# Patient Record
Sex: FEMALE | Race: WHITE | Employment: OTHER | URBAN - METROPOLITAN AREA
[De-identification: names, ages, dates, MRNs, and addresses within clinical notes are randomized per-mention and may not be internally consistent; named-entity substitution may affect disease eponyms.]

---

## 2017-05-31 ENCOUNTER — APPOINTMENT (OUTPATIENT)
Dept: GENERAL RADIOLOGY | Age: 67
End: 2017-05-31
Attending: PHYSICIAN ASSISTANT
Payer: MEDICARE

## 2017-05-31 ENCOUNTER — HOSPITAL ENCOUNTER (EMERGENCY)
Age: 67
Discharge: HOME | End: 2017-05-31
Attending: STUDENT IN AN ORGANIZED HEALTH CARE EDUCATION/TRAINING PROGRAM
Payer: MEDICARE

## 2017-05-31 VITALS
WEIGHT: 155 LBS | BODY MASS INDEX: 26.46 KG/M2 | HEIGHT: 64 IN | RESPIRATION RATE: 18 BRPM | SYSTOLIC BLOOD PRESSURE: 119 MMHG | DIASTOLIC BLOOD PRESSURE: 72 MMHG | OXYGEN SATURATION: 97 % | HEART RATE: 85 BPM | TEMPERATURE: 97.6 F

## 2017-05-31 DIAGNOSIS — S91.312A LACERATION OF FOOT, LEFT, INITIAL ENCOUNTER: Primary | ICD-10-CM

## 2017-05-31 PROCEDURE — 90715 TDAP VACCINE 7 YRS/> IM: CPT | Performed by: PHYSICIAN ASSISTANT

## 2017-05-31 PROCEDURE — 77030010507 HC ADH SKN DERMBND J&J -B

## 2017-05-31 PROCEDURE — 74011250636 HC RX REV CODE- 250/636: Performed by: PHYSICIAN ASSISTANT

## 2017-05-31 PROCEDURE — 90471 IMMUNIZATION ADMIN: CPT

## 2017-05-31 PROCEDURE — 73630 X-RAY EXAM OF FOOT: CPT

## 2017-05-31 PROCEDURE — 75810000293 HC SIMP/SUPERF WND  RPR

## 2017-05-31 PROCEDURE — 99282 EMERGENCY DEPT VISIT SF MDM: CPT

## 2017-05-31 RX ADMIN — TETANUS TOXOID, REDUCED DIPHTHERIA TOXOID AND ACELLULAR PERTUSSIS VACCINE, ADSORBED 0.5 ML: 5; 2.5; 8; 8; 2.5 SUSPENSION INTRAMUSCULAR at 18:25

## 2017-05-31 NOTE — ED PROVIDER NOTES
HPI Comments: 73 yo female here for evaluation of laceration to top of left foot. States she was barefoot in kitchen when she stepped on something sharp with right foot and scrapped it on top of left foot to remove and cut top of left foot. Bleeding controlled; full ROM. TD Unknown. No additional symptoms. Non smoker. Patient is a 74 y.o. female presenting with skin laceration. The history is provided by the patient and a friend. Laceration    The incident occurred less than 1 hour ago. The laceration is located on the left foot. The laceration is 1 cm in size. Foreign body present: no. The pain is at a severity of 2/10. The pain is mild. The pain has been constant since onset. It is unknown when the patient last had a tetanus shot. No past medical history on file. Past Surgical History:   Procedure Laterality Date    HX HYSTERECTOMY           Family History:   Problem Relation Age of Onset    Cancer Father      lungs       Social History     Social History    Marital status: SINGLE     Spouse name: N/A    Number of children: N/A    Years of education: N/A     Occupational History    Not on file. Social History Main Topics    Smoking status: Never Smoker    Smokeless tobacco: Not on file    Alcohol use 0.0 oz/week     0 Standard drinks or equivalent per week      Comment: 1-2 glass of wine per week.  Drug use: No    Sexual activity: Yes     Other Topics Concern    Not on file     Social History Narrative         ALLERGIES: Benzonatate and Codeine    Review of Systems   Constitutional: Negative for activity change and fever. HENT: Negative for facial swelling. Eyes: Negative for discharge. Respiratory: Negative for cough. Cardiovascular: Negative for leg swelling. Gastrointestinal: Negative for abdominal distention. Skin: Positive for wound. Neurological: Negative for seizures and syncope. Psychiatric/Behavioral: Negative for behavioral problems. Vitals:    05/31/17 1721   BP: 119/72   Pulse: 85   Resp: 18   Temp: 97.6 °F (36.4 °C)   SpO2: 97%   Weight: 70.3 kg (155 lb)   Height: 5' 4\" (1.626 m)            Physical Exam   Constitutional: She is oriented to person, place, and time. She appears well-nourished. HENT:   Head: Normocephalic and atraumatic. Eyes: Pupils are equal, round, and reactive to light. Neck: Normal range of motion. Cardiovascular: Normal rate and regular rhythm. Pulses:       Dorsalis pedis pulses are 2+ on the left side. Pulmonary/Chest: Effort normal and breath sounds normal.   Abdominal: Soft. There is no tenderness. Musculoskeletal: Normal range of motion. She exhibits tenderness. Feet:    Neurological: She is alert and oriented to person, place, and time. Skin: Skin is warm and dry. Psychiatric: She has a normal mood and affect. Nursing note and vitals reviewed. MDM  Number of Diagnoses or Management Options  Laceration of foot, left, initial encounter:      Amount and/or Complexity of Data Reviewed  Tests in the radiology section of CPT®: ordered and reviewed  Independent visualization of images, tracings, or specimens: yes      ED Course       Wound Closure by Adhesive  Date/Time: 5/31/2017 6:28 PM  Performed by: Gaudencio Olson  Authorized by: Gaudencio Olson     Consent:     Consent obtained:  Verbal    Consent given by:  Patient    Risks discussed:  Infection, retained foreign body and pain  Anesthesia (see MAR for exact dosages): Anesthesia method:  None  Laceration details:     Location:  Foot    Foot location:  Top of L foot  Repair type:     Repair type:  Simple  Exploration:     Contaminated: no    Treatment:     Area cleansed with:  Jamarcus    Amount of cleaning:  Standard  Skin repair:     Repair method:  Tissue adhesive  Approximation:     Approximation:  Close  Post-procedure details:     Dressing:  Non-adherent dressing    Patient tolerance of procedure:   Tolerated well, no immediate complications      Patient's results have been reviewed with them. Patient and/or family have verbally conveyed their understanding and agreement of the patient's signs, symptoms, diagnosis, treatment and prognosis and additionally agree to follow up as recommended or return to the Emergency Room should their condition change prior to follow-up. Discharge instructions have also been provided to the patient with some educational information regarding their diagnosis as well a list of reasons why they would want to return to the ER prior to their follow-up appointment should their condition change.   Rosy Blizzard, PA

## 2017-05-31 NOTE — ED TRIAGE NOTES
Pt. States she was in her kitchen and she felt something on her R heel and she rubbed it over her L foot and it cut her. Thinks it may have been an egg shell. No active bleeding. Tetanus not up to date.

## 2017-05-31 NOTE — Clinical Note
APPLY ICE FOR PAIN/SWELLING. APPLY ANTIBIOTIC OINTMENT DAILY. FOLLOW UP IF ANY REDNESS/SWELLING/DRAINAGE OR SIGNS OF INFECTION.

## 2017-05-31 NOTE — DISCHARGE INSTRUCTIONS
Cuts Closed With Adhesives: Care Instructions  Your Care Instructions  A cut can happen anywhere on your body. The doctor used an adhesive to close the cut. When the adhesive dries, it forms a film that holds the edges of the cut together. Skin adhesives are sometimes called liquid stitches. If the cut went deep and through the skin, the doctor may have put in a layer of stitches below the adhesive. The deeper layer of stitches brings the deep part of the cut together. These stitches will dissolve and don't need to be removed. You don't see the stitches, only the adhesive. You may have a bandage. The doctor has checked you carefully, but problems can develop later. If you notice any problems or new symptoms, get medical treatment right away. Follow-up care is a key part of your treatment and safety. Be sure to make and go to all appointments, and call your doctor if you are having problems. It's also a good idea to know your test results and keep a list of the medicines you take. How can you care for yourself at home? · Keep the cut dry for the first 24 to 48 hours. After this, you can shower if your doctor okays it. Pat the cut dry. · Don't soak the cut, such as in a bathtub. Your doctor will tell you when it's safe to get the cut wet. · If your doctor told you how to care for your cut, follow your doctor's instructions. If you did not get instructions, follow this general advice:  ¨ Do not put any kind of ointment, cream, or lotion over the area. This can make the adhesive fall off too soon. ¨ After the first 24 to 48 hours, wash around the cut with clean water 2 times a day. Do not use hydrogen peroxide or alcohol, which can slow healing. ¨ If the doctor told you to use a bandage, put on a new bandage after cleaning the cut or if the bandage gets wet or dirty. · Prop up the sore area on a pillow anytime you sit or lie down during the next 3 days. Try to keep it above the level of your heart. This will help reduce swelling. · Leave the skin adhesive on your skin until it falls off on its own. This may take 5 to 10 days. · Do not scratch, rub, or pick at the adhesive. · Do not put the sticky part of a bandage directly on the adhesive. · Avoid any activity that could cause your cut to reopen. · Be safe with medicines. Read and follow all instructions on the label. ¨ If the doctor gave you a prescription medicine for pain, take it as prescribed. ¨ If you are not taking a prescription pain medicine, ask your doctor if you can take an over-the-counter medicine. When should you call for help? Call your doctor now or seek immediate medical care if:  · You have new pain, or your pain gets worse. · The skin near the cut is cold or pale or changes color. · You have tingling, weakness, or numbness near the cut. · The cut starts to bleed. · You have trouble moving the area near the cut. · You have symptoms of infection, such as:  ¨ Increased pain, swelling, warmth, or redness around the cut. ¨ Red streaks leading from the cut. ¨ Pus draining from the cut. ¨ A fever. Watch closely for changes in your health, and be sure to contact your doctor if:  · The cut reopens. · You do not get better as expected. Where can you learn more? Go to http://keegan-kristal.info/. Enter P174 in the search box to learn more about \"Cuts Closed With Adhesives: Care Instructions. \"  Current as of: May 27, 2016  Content Version: 11.2  © 4446-9172 [employer]. Care instructions adapted under license by [employer] (which disclaims liability or warranty for this information). If you have questions about a medical condition or this instruction, always ask your healthcare professional. [address] any warranty or liability for your use of this information. We hope that we have addressed all of your medical concerns.  The examination and treatment you received in the Emergency Department were for an emergent problem and were not intended as complete care. It is important that you follow up with your healthcare provider(s) for ongoing care. If your symptoms worsen or do not improve as expected, and you are unable to reach your usual health care provider(s), you should return to the Emergency Department. Today's healthcare is undergoing tremendous change, and patient satisfaction surveys are one of the many tools to assess the quality of medical care. You may receive a survey from the [employer] regarding your experience in the Emergency Department. I hope that your experience has been completely positive, particularly the medical care that I provided. As such, please participate in the survey; anything less than excellent does not meet my expectations or intentions. [facility] and [place] participate in nationally recognized quality of care measures. If your blood pressure is greater than 120/80, as reported below, we urge that you seek medical care to address the potential of high blood pressure, commonly known as hypertension. Hypertension can be hereditary or can be caused by certain medical conditions, pain, stress, or \"white coat syndrome. \"       Please make an appointment with your health care provider(s) for follow up of your Emergency Department visit. VITALS:   Patient Vitals for the past 8 hrs:   Temp Pulse Resp BP SpO2   05/31/17 1721 97.6 °F (36.4 °C) 85 18 119/72 97 %          Thank you for allowing us to provide you with medical care today. We realize that you have many choices for your emergency care needs. Please choose us in the future for any continued health care needs. Ike Pepper, 12 [facility]: [phone]            No results found for this or any previous visit (from the past 24 hour(s)).     Blanca Jacobsen Foot Lt Min 3 V    Result Date: 5/31/2017  EXAM:  XR FOOT LT MIN 3 V INDICATION:   injury; r/o FB. COMPARISON:  None. FINDINGS:  Three views of the left foot demonstrate no fracture or other acute osseous or articular abnormality. There are no radiopaque foreign bodies appreciated. IMPRESSION:  No acute abnormality.

## 2017-06-02 ENCOUNTER — PATIENT OUTREACH (OUTPATIENT)
Dept: INTERNAL MEDICINE CLINIC | Age: 67
End: 2017-06-02

## 2018-01-26 RX ORDER — ACETAMINOPHEN, DIPHENHYDRAMINE HCL 500; 25 MG/1; MG/1
1 TABLET, COATED ORAL DAILY
COMMUNITY
End: 2021-08-30

## 2018-01-26 RX ORDER — OSELTAMIVIR PHOSPHATE 75 MG/1
75 CAPSULE ORAL DAILY
COMMUNITY
End: 2018-02-16 | Stop reason: ALTCHOICE

## 2018-01-26 RX ORDER — VITAMIN E 268 MG
400 CAPSULE ORAL DAILY
COMMUNITY
End: 2021-10-18 | Stop reason: ALTCHOICE

## 2018-01-26 RX ORDER — MULTIVITAMIN/IRON/FOLIC ACID 18MG-0.4MG
1 TABLET ORAL DAILY
COMMUNITY
End: 2022-05-24 | Stop reason: ALTCHOICE

## 2018-01-26 NOTE — PERIOP NOTES
[facility]  PREOPERATIVE INSTRUCTIONS    Surgery Date:  2/21/2018 Wednesday   Surgery arrival time given by surgeon: NO  (If [facility] staff will call you between 3pm - 7pm the day before surgery with your arrival time. If your surgery is on a Monday, we will call you the preceding Friday. Please call [phone] after 7pm if you did not receive your arrival time.)  1. Report  to the 2nd Floor Admitting Desk on the day of your surgery. Bring your insurance card, photo identification, and any copayment (if applicable). 2. You must have a responsible adult to drive you home and stay with you the first 24 hours after surgery if you are going home the same day of your surgery. 3. Nothing to eat or drink after midnight the night before surgery. This means NO water, gum, mints, coffee, juice, etc.    4. MEDICATIONS TO TAKE THE MORNING OF SURGERY WITH A SIP OF WATER: None. Stop all your vitamins and supplements 7 days before surgery. 5. No alcoholic beverages 24 hours before and after your surgery. 6. If you are being admitted to the hospital,please leave personal belongings/luggage in your car until you have an assigned hospital room number. ( The hospital discharge time is 12 PM NOON. Your adult  should be at the hospital prior to the noon discharge time unless otherwise instructed.)   7. STOP Aspirin and/or any non-steroidal anti-inflammatory drugs (i.e. Ibuprofen, Naproxen, Advil, Aleve) as directed by your surgeon. You may take Tylenol. Stop herbal supplements 1 week prior to  surgery. 8. If you are currently taking Plavix, Coumadin,or any other blood-thinning/ anticoagulant medication contact your surgeon for instructions. 9. Wear comfortable clothes. Wear your glasses instead of contacts. Please leave all money, jewelry and valuables at home. No make up, particularly mascara, the day of surgery. 10.  REMOVE ALL body piercings, rings,and jewelry and leave at home.   Wear your hair loose or down, no pony-tails, buns, or any metal hair clips. 11. If you shower the morning of surgery, please do not apply any lotions, powders, or deodorants afterwards. Do not shave any body area within 24 hours of your surgery. 12. Please follow all instructions to avoid any potential surgical cancellation. 13. Should your physical condition change, (i.e. fever, cold, flu, etc.) please notify your surgeon as soon as possible. 14. It is important to be on time. If a situation occurs where you may be delayed, please call:  [phone] / [phone] on the day of surgery. 15. The [facility] staff can be reached at [phone]. 16. Special instructions: Free  parking 7am-5pm  17. The patient was contacted  via phone. She  verbalize  understanding of all instructions does not  need reinforcement.

## 2018-02-16 ENCOUNTER — OFFICE VISIT (OUTPATIENT)
Dept: INTERNAL MEDICINE CLINIC | Age: 68
End: 2018-02-16

## 2018-02-16 ENCOUNTER — HOSPITAL ENCOUNTER (OUTPATIENT)
Dept: LAB | Age: 68
Discharge: HOME | End: 2018-02-16
Payer: MEDICARE

## 2018-02-16 VITALS
OXYGEN SATURATION: 98 % | RESPIRATION RATE: 12 BRPM | BODY MASS INDEX: 28.13 KG/M2 | WEIGHT: 164.8 LBS | HEART RATE: 87 BPM | SYSTOLIC BLOOD PRESSURE: 111 MMHG | HEIGHT: 64 IN | TEMPERATURE: 98.1 F | DIASTOLIC BLOOD PRESSURE: 66 MMHG

## 2018-02-16 DIAGNOSIS — S90.852A FOREIGN BODY IN LEFT FOOT, INITIAL ENCOUNTER: Primary | ICD-10-CM

## 2018-02-16 DIAGNOSIS — Z01.818 PREOP GENERAL PHYSICAL EXAM: ICD-10-CM

## 2018-02-16 PROCEDURE — 80048 BASIC METABOLIC PNL TOTAL CA: CPT

## 2018-02-16 PROCEDURE — 36415 COLL VENOUS BLD VENIPUNCTURE: CPT

## 2018-02-16 NOTE — PATIENT INSTRUCTIONS
Object in the Skin: Care Instructions  Your Care Instructions  Small objects (splinters) of wood, metal, glass, or plastic can become embedded in the skin. Thorns from [employer] and other plants also can prick or become stuck in the skin. Splinters can cause an infection if they are not removed. Your doctor probably removed the object and cleaned the skin well. Your doctor may have given you antibiotics to prevent infection and a tetanus shot if you had not had one in the last 5 years or do not know when you had your last one. For a few days, you may have pain and itching in the wound where the object was removed. Follow-up care is a key part of your treatment and safety. Be sure to make and go to all appointments, and call your doctor if you are having problems. It's also a good idea to know your test results and keep a list of the medicines you take. How can you care for yourself at home? · If your doctor told you how to care for your wound, follow your doctor's instructions. If you did not get instructions, follow this general advice:  ¨ Wash the wound with clean water 2 times a day. Don't use hydrogen peroxide or alcohol, which can slow healing. ¨ You may cover the wound with a thin layer of petroleum jelly, such as Vaseline, and a nonstick bandage. ¨ Apply more petroleum jelly and replace the bandage as needed. · Your doctor may have used medicine to numb your skin. When it wears off, your pain may return. Take an over-the-counter pain medicine, such as acetaminophen (Tylenol), ibuprofen (Advil, Motrin), or naproxen (Aleve). Read and follow all instructions on the label. · Do not take two or more pain medicines at the same time unless the doctor told you to. Many pain medicines have acetaminophen, which is Tylenol. Too much acetaminophen (Tylenol) can be harmful. · If your doctor prescribed antibiotics, take them as directed. Do not stop taking them just because you feel better.  You need to take the full course of antibiotics. · After 2 or 3 days, if your swelling is gone, apply a heating pad set on low or a warm cloth to your wound area. Some doctors suggest that you go back and forth between hot and cold. Put a thin cloth between the heating pad and your skin. · It may help to prop up the affected part of your body on a pillow anytime you sit or lie down during the next 3 days. Try to keep it above the level of your heart. This will help reduce swelling. · Your wound may itch or feel irritated. A little redness and swelling is normal. Do not scratch or rub the wound. When should you call for help? Call your doctor now or seek immediate medical care if:  ? · The skin near the wound is cool or pale or changes color. ? · You have tingling, weakness, or numbness in the area near the wound. ? · The wound starts to bleed, and blood soaks the bandage. Oozing small amounts of blood is normal.   ? · You have trouble moving a limb near the wound. ? · You have signs of infection, such as:  ¨ Increased pain, swelling, warmth, or redness. ¨ Red streaks leading from the wound. ¨ Pus draining from the wound. ¨ A fever. ? Watch closely for changes in your health, and be sure to contact your doctor if:  ? · You do not get better as expected. Where can you learn more? Go to http://keegan-kristal.info/. Enter Connor Meraz in the search box to learn more about \"Object in the Skin: Care Instructions. \"  Current as of: March 20, 2017  Content Version: 11.4  © 3829-8502 [employer]. Care instructions adapted under license by [employer] (which disclaims liability or warranty for this information). If you have questions about a medical condition or this instruction, always ask your healthcare professional. [address] any warranty or liability for your use of this information.

## 2018-02-16 NOTE — PROGRESS NOTES
HISTORY OF PRESENT ILLNESS  Derrell Paz is a 74 y.o. female. HPI  Derrell Paz was referred for evaluation by:Dr. Nathan Mazariegos for Pre- Op Evaluation. Please see encounter details and orders for consultative summary. Type of surgery : Foreign Body removal Left foot  Surgery site : Left foot  Anesthesia type: Ankle block with sedation  Date of procedure:  2/21/2018    Allergies: Allergies   Allergen Reactions    Benzonatate Other (comments)     headache    Codeine Other (comments)     Migraine per pt. Latex allergy: no  Prior reactions to anesthesia:  Nausea and vomiting    Functional status:  she is able to ambulate up 2 flights of step without shortness of breath, chest pain  Prior cardiac evaluation:   none    Current Outpatient Prescriptions   Medication Sig    glucosamine-chondroitin (ELATION) 1,500-1,200 mg/30 mL liqd Take 1 Tab by mouth daily.  vitamin E (AQUA GEMS) 400 unit capsule Take 400 Units by mouth daily.  MULTIVIT-MIN/IRON/FOLIC/LUTEIN (CENTRUM SILVER WOMEN PO) Take 1 Tab by mouth daily.  VITAMIN B COMPLEX (SUPER B-50 COMPLEX PO) Take 1 Tab by mouth daily.  ginkgo biloba 120 mg tab Take 1 Tab by mouth daily.  diphenhydrAMINE (BENADRYL) 25 mg capsule Take 25 mg by mouth nightly. No current facility-administered medications for this visit.       Past Medical History:   Diagnosis Date    Nausea & vomiting      Past Surgical History:   Procedure Laterality Date    BREAST SURGERY PROCEDURE UNLISTED      Left breast biopsy (benign)    HX COLONOSCOPY      HX GYN      uterine cryo therapy    HX ORTHOPAEDIC      Right foot bunionectomy and neuroma removal    HX ORTHOPAEDIC Bilateral     ingrown toenails removed    HX VERITO AND BSO      HX TUBAL LIGATION       Social History   Substance Use Topics    Smoking status: Never Smoker    Smokeless tobacco: Never Used    Alcohol use 1.2 oz/week     2 Glasses of wine, 0 Standard drinks or equivalent per week      Comment: 1-2 glass of wine per week       Blood pressure 111/66, pulse 87, temperature 98.1 °F (36.7 °C), temperature source Oral, resp. rate 12, height 5' 4\" (1.626 m), weight 164 lb 12.8 oz (74.8 kg), SpO2 98 %. Review of Systems   Constitutional: Negative for chills and fever. HENT: Negative for congestion and sore throat. Respiratory: Negative for cough and shortness of breath. Cardiovascular: Negative for chest pain, palpitations and leg swelling. Gastrointestinal: Negative for abdominal pain, constipation, diarrhea, nausea and vomiting. Genitourinary: Negative for dysuria and frequency. Musculoskeletal: Positive for joint pain (left foot tenderness). Negative for myalgias. Skin: Negative for rash. Neurological: Negative for dizziness and headaches. Physical Exam   Constitutional: She is oriented to person, place, and time. She appears well-developed and well-nourished. HENT:   Head: Normocephalic and atraumatic. Right Ear: External ear normal.   Left Ear: External ear normal.   Nose: Nose normal.   Mouth/Throat: Oropharynx is clear and moist.   Neck: Normal range of motion. Neck supple. No thyromegaly present. Cardiovascular: Normal rate, regular rhythm and normal heart sounds. Pulmonary/Chest: Effort normal and breath sounds normal. She has no wheezes. She has no rales. Abdominal: Soft. Bowel sounds are normal. There is no tenderness. There is no rebound. Musculoskeletal:        Left foot: There is deformity. Feet:    Enlarged mass to dorsal surface of left foot   Lymphadenopathy:     She has no cervical adenopathy. Neurological: She is alert and oriented to person, place, and time. Skin: Skin is warm and dry. Psychiatric: She has a normal mood and affect. Her behavior is normal.   Nursing note and vitals reviewed. ASSESSMENT and PLAN  Diagnoses and all orders for this visit:    1. Foreign body in left foot, initial encounter    2.  Preop general physical exam - considered medically acceptable risk for upcoming surgery to left foot  -     METABOLIC PANEL, BASIC    Encouraged to make appointment for Welcome to Medicare physical with her PCP Dr Chetan Dee  lab results and schedule of future lab studies reviewed with patient  reviewed diet, exercise and weight control  reviewed medications and side effects in detail

## 2018-02-16 NOTE — MR AVS SNAPSHOT
[facility] 
 
 
 [address] [place] [address] [facility] 
[phone] Patient: Nyasia Mays MRN: [id number] Visit Information Date & Time Provider Department Dept. Phone Encounter #  
 2/16/2018 10:00 AM Yohana Thornton NP [facility] [id number] Upcoming Health Maintenance Date Due Hepatitis C Screening 1950 BREAST CANCER SCRN MAMMOGRAM 4/17/2002 FOBT Q 1 YEAR AGE 50-75 4/17/2002 GLAUCOMA SCREENING Q2Y 4/17/2017 Pneumococcal 65+ Low/Medium Risk (1 of 2 - PCV13) 4/17/2017 MEDICARE YEARLY EXAM 4/17/2017 Influenza Age 5 to Adult 8/1/2017 DTaP/Tdap/Td series (2 - Td) 5/31/2027 Allergies as of 2/16/2018  Review Complete On: 2/16/2018 By: Yohana Thornton NP Severity Noted Reaction Type Reactions Benzonatate  05/26/2016    Other (comments)  
 headache Codeine  12/10/2015    Other (comments) Migraine per pt. Current Immunizations  Reviewed on 2/16/2018 Name Date Tdap 5/31/2017  6:25 PM  
 Zoster Vaccine, Live 1/1/2012 Reviewed by Yohana Thornton NP on 2/16/2018 at 10:19 AM  
You Were Diagnosed With   
  
 Codes Comments Foreign body in left foot, initial encounter    -  Primary ICD-10-CM: N19.403X ICD-9-CM: 917.6 Preop general physical exam     ICD-10-CM: N32.885 ICD-9-CM: V72.83 Vitals BP Pulse Temp Resp Height(growth percentile) Weight(growth percentile) 111/66 (BP 1 Location: Left arm, BP Patient Position: Sitting) 87 98.1 °F (36.7 °C) (Oral) 12 5' 4\" (1.626 m) 164 lb 12.8 oz (74.8 kg) SpO2 BMI OB Status Smoking Status 98% 28.29 kg/m2 Hysterectomy Never Smoker BMI and BSA Data Body Mass Index Body Surface Area  
 28.29 kg/m 2 1.84 m 2 Preferred Pharmacy Pharmacy Name Phone [id number] - [address], [address] [place] [address] [phone] Your Updated Medication List  
  
   
This list is accurate as of: 2/16/18 10:24 AM.  Always use your most recent med list.  
  
  
  
  
 BENADRYL 25 mg capsule Generic drug:  diphenhydrAMINE Take 25 mg by mouth nightly. CENTRUM SILVER WOMEN PO Take 1 Tab by mouth daily. ginkgo biloba 120 mg Tab Take 1 Tab by mouth daily. glucosamine-chondroitin 1,500-1,200 mg/30 mL Liqd Commonly known as:  ELATION Take 1 Tab by mouth daily. SUPER B-50 COMPLEX PO Take 1 Tab by mouth daily. vitamin E 400 unit capsule Commonly known as:  [address] Take 400 Units by mouth daily. We Performed the Following METABOLIC PANEL, BASIC [56307 CPT(R)] Patient Instructions Object in the Skin: Care Instructions Your Care Instructions Small objects (splinters) of wood, metal, glass, or plastic can become embedded in the skin. Thorns from [employer] and other plants also can prick or become stuck in the skin. Splinters can cause an infection if they are not removed. Your doctor probably removed the object and cleaned the skin well. Your doctor may have given you antibiotics to prevent infection and a tetanus shot if you had not had one in the last 5 years or do not know when you had your last one. For a few days, you may have pain and itching in the wound where the object was removed. Follow-up care is a key part of your treatment and safety. Be sure to make and go to all appointments, and call your doctor if you are having problems. It's also a good idea to know your test results and keep a list of the medicines you take. How can you care for yourself at home? · If your doctor told you how to care for your wound, follow your doctor's instructions. If you did not get instructions, follow this general advice: ¨ Wash the wound with clean water 2 times a day. Don't use hydrogen peroxide or alcohol, which can slow healing. ¨ You may cover the wound with a thin layer of petroleum jelly, such as Vaseline, and a nonstick bandage. ¨ Apply more petroleum jelly and replace the bandage as needed. · Your doctor may have used medicine to numb your skin. When it wears off, your pain may return. Take an over-the-counter pain medicine, such as acetaminophen (Tylenol), ibuprofen (Advil, Motrin), or naproxen (Aleve). Read and follow all instructions on the label. · Do not take two or more pain medicines at the same time unless the doctor told you to. Many pain medicines have acetaminophen, which is Tylenol. Too much acetaminophen (Tylenol) can be harmful. · If your doctor prescribed antibiotics, take them as directed. Do not stop taking them just because you feel better. You need to take the full course of antibiotics. · After 2 or 3 days, if your swelling is gone, apply a heating pad set on low or a warm cloth to your wound area. Some doctors suggest that you go back and forth between hot and cold. Put a thin cloth between the heating pad and your skin. · It may help to prop up the affected part of your body on a pillow anytime you sit or lie down during the next 3 days. Try to keep it above the level of your heart. This will help reduce swelling. · Your wound may itch or feel irritated. A little redness and swelling is normal. Do not scratch or rub the wound. When should you call for help? Call your doctor now or seek immediate medical care if: 
? · The skin near the wound is cool or pale or changes color. ? · You have tingling, weakness, or numbness in the area near the wound. ? · The wound starts to bleed, and blood soaks the bandage. Oozing small amounts of blood is normal.  
? · You have trouble moving a limb near the wound. ? · You have signs of infection, such as: 
¨ Increased pain, swelling, warmth, or redness. ¨ Red streaks leading from the wound. ¨ Pus draining from the wound. ¨ A fever. ?Watch closely for changes in your health, and be sure to contact your doctor if: 
? · You do not get better as expected. Where can you learn more? Go to http://keegan-kristal.info/. Enter Todd Hernandez in the search box to learn more about \"Object in the Skin: Care Instructions. \" Current as of: March 20, 2017 Content Version: 11.4 © 2852-1034 [employer]. Care instructions adapted under license by [employer] (which disclaims liability or warranty for this information). If you have questions about a medical condition or this instruction, always ask your healthcare professional. [address] any warranty or liability for your use of this information. Introducing [facility]! Dear Albert Ding: Thank you for requesting a [employer] account. Our records indicate that you already have an active [employer] account. You can access your account anytime at [email or web address] Did you know that you can access your hospital and ER discharge instructions at any time in [employer]? You can also review all of your test results from your hospital stay or ER visit. Additional Information If you have questions, please visit the Frequently Asked Questions section of the [employer] website at [email or web address] Remember, [employer] is NOT to be used for urgent needs. For medical emergencies, dial 911. Now available from your iPhone and Android! Please provide this summary of care documentation to your next provider. Your primary care clinician is listed as Angelica Chester. If you have any questions after today's visit, please call [phone].

## 2018-02-17 LAB
BUN SERPL-MCNC: 11 MG/DL (ref 8–27)
BUN/CREAT SERPL: 14 (ref 12–28)
CALCIUM SERPL-MCNC: 9.6 MG/DL (ref 8.7–10.3)
CHLORIDE SERPL-SCNC: 98 MMOL/L (ref 96–106)
CO2 SERPL-SCNC: 30 MMOL/L (ref 18–29)
CREAT SERPL-MCNC: 0.81 MG/DL (ref 0.57–1)
GFR SERPLBLD CREATININE-BSD FMLA CKD-EPI: 76 ML/MIN/1.73
GFR SERPLBLD CREATININE-BSD FMLA CKD-EPI: 88 ML/MIN/1.73
GLUCOSE SERPL-MCNC: 98 MG/DL (ref 65–99)
POTASSIUM SERPL-SCNC: 4.7 MMOL/L (ref 3.5–5.2)
SODIUM SERPL-SCNC: 141 MMOL/L (ref 134–144)

## 2018-02-20 ENCOUNTER — ANESTHESIA EVENT (OUTPATIENT)
Dept: SURGERY | Age: 68
End: 2018-02-20
Payer: MEDICARE

## 2018-02-21 ENCOUNTER — HOSPITAL ENCOUNTER (OUTPATIENT)
Age: 68
Setting detail: OUTPATIENT SURGERY
Discharge: HOME | End: 2018-02-21
Attending: ORTHOPAEDIC SURGERY | Admitting: ORTHOPAEDIC SURGERY
Payer: MEDICARE

## 2018-02-21 ENCOUNTER — ANESTHESIA (OUTPATIENT)
Dept: SURGERY | Age: 68
End: 2018-02-21
Payer: MEDICARE

## 2018-02-21 VITALS
BODY MASS INDEX: 28.04 KG/M2 | TEMPERATURE: 97.9 F | DIASTOLIC BLOOD PRESSURE: 66 MMHG | WEIGHT: 164.24 LBS | OXYGEN SATURATION: 100 % | RESPIRATION RATE: 23 BRPM | SYSTOLIC BLOOD PRESSURE: 114 MMHG | HEART RATE: 73 BPM | HEIGHT: 64 IN

## 2018-02-21 PROCEDURE — 77030011640 HC PAD GRND REM COVD -A: Performed by: ORTHOPAEDIC SURGERY

## 2018-02-21 PROCEDURE — 77030036687 HC SHOE PSTOP S2SG -A

## 2018-02-21 PROCEDURE — 74011250636 HC RX REV CODE- 250/636

## 2018-02-21 PROCEDURE — 77030000032 HC CUF TRNQT ZIMM -B: Performed by: ORTHOPAEDIC SURGERY

## 2018-02-21 PROCEDURE — 77030002916 HC SUT ETHLN J&J -A: Performed by: ORTHOPAEDIC SURGERY

## 2018-02-21 PROCEDURE — 76210000026 HC REC RM PH II 1 TO 1.5 HR: Performed by: ORTHOPAEDIC SURGERY

## 2018-02-21 PROCEDURE — 74011250636 HC RX REV CODE- 250/636: Performed by: ANESTHESIOLOGY

## 2018-02-21 PROCEDURE — 74011000250 HC RX REV CODE- 250

## 2018-02-21 PROCEDURE — 77030020782 HC GWN BAIR PAWS FLX 3M -B

## 2018-02-21 PROCEDURE — 76010000154 HC OR TIME FIRST 0.5 HR: Performed by: ORTHOPAEDIC SURGERY

## 2018-02-21 PROCEDURE — 77030018836 HC SOL IRR NACL ICUM -A: Performed by: ORTHOPAEDIC SURGERY

## 2018-02-21 PROCEDURE — 76060000031 HC ANESTHESIA FIRST 0.5 HR: Performed by: ORTHOPAEDIC SURGERY

## 2018-02-21 PROCEDURE — 74011250636 HC RX REV CODE- 250/636: Performed by: ORTHOPAEDIC SURGERY

## 2018-02-21 RX ORDER — LIDOCAINE HYDROCHLORIDE 20 MG/ML
INJECTION, SOLUTION INFILTRATION; PERINEURAL AS NEEDED
Status: DISCONTINUED | OUTPATIENT
Start: 2018-02-21 | End: 2018-02-21 | Stop reason: HOSPADM

## 2018-02-21 RX ORDER — MIDAZOLAM HYDROCHLORIDE 1 MG/ML
INJECTION, SOLUTION INTRAMUSCULAR; INTRAVENOUS AS NEEDED
Status: DISCONTINUED | OUTPATIENT
Start: 2018-02-21 | End: 2018-02-21 | Stop reason: HOSPADM

## 2018-02-21 RX ORDER — CEFAZOLIN SODIUM/WATER 2 G/20 ML
2 SYRINGE (ML) INTRAVENOUS ONCE
Status: COMPLETED | OUTPATIENT
Start: 2018-02-21 | End: 2018-02-21

## 2018-02-21 RX ORDER — LIDOCAINE HYDROCHLORIDE 10 MG/ML
0.1 INJECTION, SOLUTION EPIDURAL; INFILTRATION; INTRACAUDAL; PERINEURAL AS NEEDED
Status: DISCONTINUED | OUTPATIENT
Start: 2018-02-21 | End: 2018-02-21 | Stop reason: HOSPADM

## 2018-02-21 RX ORDER — SODIUM CHLORIDE, SODIUM LACTATE, POTASSIUM CHLORIDE, CALCIUM CHLORIDE 600; 310; 30; 20 MG/100ML; MG/100ML; MG/100ML; MG/100ML
125 INJECTION, SOLUTION INTRAVENOUS CONTINUOUS
Status: DISCONTINUED | OUTPATIENT
Start: 2018-02-21 | End: 2018-02-21 | Stop reason: HOSPADM

## 2018-02-21 RX ORDER — ONDANSETRON 2 MG/ML
INJECTION INTRAMUSCULAR; INTRAVENOUS AS NEEDED
Status: DISCONTINUED | OUTPATIENT
Start: 2018-02-21 | End: 2018-02-21 | Stop reason: HOSPADM

## 2018-02-21 RX ORDER — SODIUM CHLORIDE 0.9 % (FLUSH) 0.9 %
5-10 SYRINGE (ML) INJECTION AS NEEDED
Status: DISCONTINUED | OUTPATIENT
Start: 2018-02-21 | End: 2018-02-21 | Stop reason: HOSPADM

## 2018-02-21 RX ORDER — ONDANSETRON 2 MG/ML
4 INJECTION INTRAMUSCULAR; INTRAVENOUS AS NEEDED
Status: DISCONTINUED | OUTPATIENT
Start: 2018-02-21 | End: 2018-02-21 | Stop reason: HOSPADM

## 2018-02-21 RX ORDER — PROPOFOL 10 MG/ML
INJECTION, EMULSION INTRAVENOUS
Status: DISCONTINUED | OUTPATIENT
Start: 2018-02-21 | End: 2018-02-21 | Stop reason: HOSPADM

## 2018-02-21 RX ORDER — SODIUM CHLORIDE 0.9 % (FLUSH) 0.9 %
5-10 SYRINGE (ML) INJECTION EVERY 8 HOURS
Status: DISCONTINUED | OUTPATIENT
Start: 2018-02-21 | End: 2018-02-21 | Stop reason: HOSPADM

## 2018-02-21 RX ORDER — HYDROMORPHONE HYDROCHLORIDE 1 MG/ML
.5-1 INJECTION, SOLUTION INTRAMUSCULAR; INTRAVENOUS; SUBCUTANEOUS
Status: DISCONTINUED | OUTPATIENT
Start: 2018-02-21 | End: 2018-02-21 | Stop reason: HOSPADM

## 2018-02-21 RX ADMIN — ONDANSETRON 8 MG: 2 INJECTION INTRAMUSCULAR; INTRAVENOUS at 07:40

## 2018-02-21 RX ADMIN — Medication 2 G: at 07:40

## 2018-02-21 RX ADMIN — PROPOFOL 75 MCG/KG/MIN: 10 INJECTION, EMULSION INTRAVENOUS at 07:38

## 2018-02-21 RX ADMIN — SODIUM CHLORIDE, SODIUM LACTATE, POTASSIUM CHLORIDE, AND CALCIUM CHLORIDE 125 ML/HR: 600; 310; 30; 20 INJECTION, SOLUTION INTRAVENOUS at 06:22

## 2018-02-21 RX ADMIN — LIDOCAINE HYDROCHLORIDE 100 MG: 20 INJECTION, SOLUTION INFILTRATION; PERINEURAL at 07:38

## 2018-02-21 RX ADMIN — MIDAZOLAM HYDROCHLORIDE 3 MG: 1 INJECTION, SOLUTION INTRAMUSCULAR; INTRAVENOUS at 07:11

## 2018-02-21 RX ADMIN — MIDAZOLAM HYDROCHLORIDE 1 MG: 1 INJECTION, SOLUTION INTRAMUSCULAR; INTRAVENOUS at 07:12

## 2018-02-21 RX ADMIN — MIDAZOLAM HYDROCHLORIDE 1 MG: 1 INJECTION, SOLUTION INTRAMUSCULAR; INTRAVENOUS at 07:13

## 2018-02-21 NOTE — ANESTHESIA PREPROCEDURE EVALUATION
Anesthetic History     PONV          Review of Systems / Medical History  Patient summary reviewed, nursing notes reviewed and pertinent labs reviewed    Pulmonary  Within defined limits                 Neuro/Psych   Within defined limits           Cardiovascular  Within defined limits                Exercise tolerance: >4 METS     GI/Hepatic/Renal  Within defined limits              Endo/Other  Within defined limits           Other Findings              Physical Exam    Airway  Mallampati: II  TM Distance: 4 - 6 cm  Neck ROM: normal range of motion   Mouth opening: Normal     Cardiovascular    Rhythm: regular  Rate: normal         Dental    Dentition: Lower dentition intact, Upper dentition intact and Bridges     Pulmonary  Breath sounds clear to auscultation               Abdominal         Other Findings            Anesthetic Plan    ASA: 1  Anesthesia type: regional          Induction: Intravenous  Anesthetic plan and risks discussed with: Patient

## 2018-02-21 NOTE — DISCHARGE INSTRUCTIONS
PATIENT INSTRUCTIONS:     After general anesthesia or intravenous sedation, for 24 hours or while taking prescription Narcotics:  · Limit your activities  · Do not drive and operate hazardous machinery  · Do not make important personal or business decisions  · Do  not drink alcoholic beverages  · If you have not urinated within 8 hours after discharge, please contact your surgeon on call. Report the following to your surgeon:  · Excessive pain, swelling, redness or odor of or around the surgical area  · Temperature over 100.5  · Nausea and vomiting lasting longer than 4 hours or if unable to take medications  · Any signs of decreased circulation or nerve impairment to extremity: change in color, persistent  numbness, tingling, coldness or increase pain  · Any questions    COUGH AND DEEP BREATHE    Breathing deep and coughing are very important exercises to do after surgery. Deep breathing and coughing open the little air tubes and air sacks in your lungs. You take deep breaths every day. You may not even notice - it is just something you do when you sigh or yawn. It is a natural exercise you do to keep these air passages open. After surgery, take deep breaths and cough, on purpose. Coughing and deep breathing help prevent bronchitis and pneumonia after surgery. If you had chest or belly surgery, use a pillow as a \"hug buddy\" and hold it tightly to your chest or belly when you cough. DIRECTIONS:  6. Take 10 to 15 slow deep breaths every hour while awake. 7. Breathe in deeply, and hold it for 2 seconds. 8. Exhale slowly through puckered lips, like blowing up a balloon. 9. After every 4th or 5th deep breath, hug your pillow to your chest or belly and give a hard, deep cough. Yes, it will probably hurt. But doing this exercise is very important part of healing after surgery. Take your pain medicine to help you do this exercise without too much pain.     IF YOU HAVE BEEN DIAGNOSED WITH SLEEP APNEA, PLEASE USE YOUR SLEEP APNEA DEVICE OR CPAP MACHINE WHEN YOU INTEND TO NAP AFTER TAKING PAIN MEDICATION. Ankle Pumps    Ankle pumps increase the circulation of oxygenated blood to your lower extremities and decrease your risk for circulation problems such as blood clots. They also stretch the muscles, tendons and ligaments in your foot and ankle, and prevent joint contracture in the ankle and foot, especially after surgeries on the legs. It is important to do ankle pump exercises regularly after surgery because immobility increases your risk for developing a blood clot. Your doctor may also have you take an Aspirin for the next few days as well. If your doctor did not ask you to take an Aspirin, consult with him before starting Aspirin therapy on your own. Slowly point your foot forward, feeling the muscles on the top of your lower leg stretch, and hold this position for 5 seconds. Next, pull your foot back toward you as far as possible, stretching the calf muscles, and hold that position for 5 seconds. Repeat with the other foot. Perform 10 repetitions every hour while awake for both ankles if possible (down and then up with the foot once is one repetition). You should feel gentle stretching of the muscles in your lower leg when doing this exercise. If you feel pain, or your range of motion is limited, don't  Push too hard. Only go the limit your joint and muscles will let you go. If you have increasing pain, progressively worsening leg warmth or swelling, STOP the exercise and call your doctor. About Smoking  No smoking / No tobacco products / Avoid exposure to second hand smoke    Surgeon General's Warning:  Quitting smoking now greatly reduces serious risk to your health. Obesity, smoking, and sedentary lifestyle greatly increases your risk for illness and disease.   A healthy diet, regular physical exercise & weight monitoring are important for maintaining a healthy lifestyle. Congestive Heart Failure  You may be retaining fluid if you have a history of heart failure or if you experience any of the following symptoms:  Weight gain of 3 pounds or more overnight or 5 pounds in a week, increased swelling in our hands or feet or shortness of breath while lying flat in bed. Please call your doctor as soon as you notice any of these symptoms; do not wait until your next office visit. Recognize signs and symptoms of STROKE:  F - face looks uneven  A - arms unable to move or move even  S - speech slurred or non-existent  T - time-call 911 as soon as signs and symptoms begin-DO NOT go         Back to bed or wait to see if you get better-TIME IS BRAIN. Warning signs of HEART ATTACK  Call 911 if you have these symptoms    · Chest discomfort. Most heart attacks involve discomfort in the center of the chest that lasts more than a few minutes, or that goes away and comes back. It can feel like uncomfortable pressure, squeezing, fullness, or pain. · Discomfort in other areas of the upper body. Symptoms can include pain or discomfort in one or both        Arms, the back, neck, jaw, or stomach. ·  Shortness of breath with or without chest discomfort. · Other signs may include breaking out in a cold sweat, nausea, or lightheadedness    Don't wait more than five minutes to call 911 - MINUTES MATTER! Fast action can save your life. Calling 911 is almost always the fastest way to get lifesaving treatment. Emergency Medical Services staff can begin treatment when they arrive - up to an hour sooner than if someone gets to the hospital by car. [facility] MEDICATION AND SIDE EFFECT GUIDE    The [facility] MEDICATION AND SIDE EFFECT GUIDE was provided to the PATIENT AND CARE PROVIDER.   Information provided includes instruction about drug purpose and common side effects for the following medications:    oxycodone

## 2018-02-21 NOTE — BRIEF OP NOTE
BRIEF OPERATIVE NOTE    Date of Procedure: 2/21/2018   Preoperative Diagnosis: FOREIGN BODY LEFT FOOT  Postoperative Diagnosis: * No post-op diagnosis entered *    Procedure(s):  REMOVAL FOREIGN BODY LEFT FOOT  (ANKLE)  Surgeon(s) and Role:     * Em Brown MD - Primary         Assistant Staff: None      Surgical Staff:  Circ-1: Nasreen Duran RN  Scrub Tech-1: Matthias Maxwell.  Rajiv  Surg Asst-1: Marquez Sharma  Event Time In   Incision Start 0743   Incision Close 0745     Anesthesia: Other   Estimated Blood Loss: 0  Specimens: * No specimens in log *   Findings: 0   Complications: 0  Implants: * No implants in log *

## 2018-02-21 NOTE — ANESTHESIA PROCEDURE NOTES
Peripheral Block    Start time: 2/21/2018 7:10 AM  End time: 2/21/2018 7:18 AM  Performed by: Mahad Arzola  Authorized by: Mahad Arzola       Pre-procedure: Indications: at surgeon's request and primary anesthetic    Preanesthetic Checklist: patient identified, risks and benefits discussed, site marked, timeout performed, anesthesia consent given and patient being monitored    Timeout Time: 07:10          Block Type:   Block Type:   Ankle  Laterality:  Left  Monitoring:  Continuous pulse ox, frequent vital sign checks, heart rate, responsive to questions and oxygen  Injection Technique:  Single shot  Patient Position: seated  Prep: chlorhexidine    Location:  Foot  Needle Localization:  Anatomical landmarks, infiltration and ultrasound guidance  Medication Injected:  0.5%  ropivacaine  Volume (mL):  25  Add'l Medication Injected:  2.0%  mepivacaine  Volume (mL):  25    Assessment:  Number of attempts:  1  Injection Assessment:  Incremental injection every 5 mL, local visualized surrounding nerve on ultrasound, negative aspiration for blood, no paresthesia and no intravascular symptoms  Patient tolerance:  Patient tolerated the procedure well with no immediate complications

## 2018-02-21 NOTE — OP NOTES
[address] REPORT    Name:BELÉN Rubio  MR#: [id number]  : 1950  ACCOUNT #: [de-identified]   DATE OF SERVICE: 2018    PREOPERATIVE DIAGNOSIS:  Foreign body, left foot. POSTOPERATIVE DIAGNOSIS:  Foreign body, left foot. PROCEDURE PERFORMED:  Removal of foreign body, left foot. SURGEON:  Merlinda Gallery, MD    ANESTHESIA:  Ankle block. COMPLICATIONS:  None encountered. TOURNIQUET TIME:  2 minutes. SPECIMEN:  None. BLOOD LOSS:  Nil. ASSISTANTS:  None. IMPLANTS:  None. PROCEDURE IN DETAIL:  After consents were obtained and preoperative sedation, the patient was taken to the operating suite and underwent an ankle block without difficulty. A pneumatic tourniquet was placed around the left ankle and the left foot was scrubbed and draped in the usual sterile fashion. After Esmarch exsanguination, the tourniquet was inflated to 300 mmHg. The foreign body in the foot was starting to protrude from the skin and with elevation of the skin, the foreign body was able to be grabbed with a hemostat and removed from the wound without difficulty. There was no sign of infection. The wound was irrigated and left open for secondary closure. A sterile compressive postoperative dressing was applied. The tourniquet was deflated and the patient was awakened from anesthesia and taken to the recovery room in satisfactory condition.       MD Ag Herron / Rox Issa  D: 2018 07:51     T: 2018 10:26  JOB #: [id number]

## 2018-02-21 NOTE — ANESTHESIA POSTPROCEDURE EVALUATION
Post-Anesthesia Evaluation and Assessment    Patient: Trip Templeton MRN: [id number]  SSN: [id number]    YOB: 1950  Age: 74 y.o. Sex: female       Cardiovascular Function/Vital Signs  Visit Vitals    BP 97/61    Pulse 69    Temp 36.6 °C (97.8 °F)    Resp 15    Ht 5' 4\" (1.626 m)    Wt 74.5 kg (164 lb 3.9 oz)    SpO2 100%    BMI 28.19 kg/m2       Patient is status post regional, MAC anesthesia for Procedure(s):  REMOVAL FOREIGN BODY LEFT FOOT  (ANKLE). Nausea/Vomiting: None    Postoperative hydration reviewed and adequate. Pain:  Pain Scale 1: Numeric (0 - 10) (02/21/18 0610)  Pain Intensity 1: 0 (02/21/18 0610)   Managed    Neurological Status:   Neuro (WDL): Within Defined Limits (02/21/18 0625)   At baseline    Mental Status and Level of Consciousness: Arousable    Pulmonary Status:   O2 Device: Room air (02/21/18 0754)   Adequate oxygenation and airway patent    Complications related to anesthesia: None    Post-anesthesia assessment completed.  No concerns    Signed By: Alice Fox MD     February 21, 2018

## 2018-03-09 ENCOUNTER — OFFICE VISIT (OUTPATIENT)
Dept: INTERNAL MEDICINE CLINIC | Age: 68
End: 2018-03-09

## 2018-03-09 VITALS
TEMPERATURE: 98 F | DIASTOLIC BLOOD PRESSURE: 74 MMHG | HEIGHT: 64 IN | OXYGEN SATURATION: 95 % | HEART RATE: 70 BPM | SYSTOLIC BLOOD PRESSURE: 107 MMHG | RESPIRATION RATE: 16 BRPM | BODY MASS INDEX: 28.68 KG/M2 | WEIGHT: 168 LBS

## 2018-03-09 DIAGNOSIS — M85.80 OSTEOPENIA, UNSPECIFIED LOCATION: ICD-10-CM

## 2018-03-09 DIAGNOSIS — Z78.0 POSTMENOPAUSAL: ICD-10-CM

## 2018-03-09 DIAGNOSIS — E78.5 HYPERLIPIDEMIA, UNSPECIFIED HYPERLIPIDEMIA TYPE: ICD-10-CM

## 2018-03-09 DIAGNOSIS — E55.9 VITAMIN D DEFICIENCY: ICD-10-CM

## 2018-03-09 DIAGNOSIS — Z00.00 WELCOME TO MEDICARE PREVENTIVE VISIT: Primary | ICD-10-CM

## 2018-03-09 DIAGNOSIS — Z23 ENCOUNTER FOR IMMUNIZATION: ICD-10-CM

## 2018-03-09 NOTE — PATIENT INSTRUCTIONS

## 2018-03-09 NOTE — PROGRESS NOTES
Jagdeep Templeton is a 74 y.o. female who presents today for Welcome To Medicare  . She has a history of   Patient Active Problem List   Diagnosis Code    Osteopenia M85.80    Hyperlipidemia E78.5   . Today patient is here for WT. she does have other concerns. Had a recent piece of glass removed from [employer]. Doing better. Hyperlipidemia  A bit up. Will repeat. Lab Results   Component Value Date/Time    Cholesterol, total 266 (H) 12/10/2015 11:38 AM    HDL Cholesterol 83 12/10/2015 11:38 AM    LDL, calculated 157 (H) 12/10/2015 11:38 AM    VLDL, calculated 26 12/10/2015 11:38 AM    Triglyceride 129 12/10/2015 11:38 AM       Health maintenance hx includes:  Exercise: moderately active. Form of exercise: walking a lot. Diet: generally follows a low fat low cholesterol diet, nonsmoker, alcohol intake socially  Social: works in [employer]. Still working full time. Lives with partner at home. Two children: 40, and 43. 7 Grandchildren. From [place] originally. Screening:    Colon cancer screening:  Last Colonoscopy: 2011 and   was normal   Breast cancer screening: last mammogram 2017 and   was normal   Cervical cancer screening: last PAP/Pelvic exam: 2016   and was normal. Sees Dr. Paz Cuevas. Total Hyst in 50's. Osteoporosis screening:  Last BMD:  2016 and revealed    - Osteopenia      Immunizations:     Immunization History   Administered Date(s) Administered    Tdap 05/31/2017    Zoster Vaccine, Live 01/01/2012      Immunization status: Has had shingles. Prevnar Rx today. ROS  Review of Systems   Constitutional: Negative for chills, fever, malaise/fatigue and weight loss. Eyes: Negative for blurred vision, double vision, photophobia, pain and discharge. Respiratory: Negative for cough and shortness of breath. Cardiovascular: Negative for chest pain, palpitations and leg swelling. Gastrointestinal: Negative for abdominal pain, nausea and vomiting.    Neurological: Negative. Endo/Heme/Allergies: Does not bruise/bleed easily. Psychiatric/Behavioral: Negative for depression. The patient is not nervous/anxious. Visit Vitals    /74 (BP 1 Location: Left arm, BP Patient Position: Sitting)    Pulse 70    Temp 98 °F (36.7 °C) (Oral)    Resp 16    Ht 5' 4\" (1.626 m)    Wt 168 lb (76.2 kg)    SpO2 95%    BMI 28.84 kg/m2       Physical Exam   Constitutional: She is oriented to person, place, and time. She appears well-developed and well-nourished. HENT:   Head: Normocephalic and atraumatic. Cardiovascular: Normal rate and regular rhythm. No murmur heard. Pulmonary/Chest: Effort normal. No respiratory distress. Neurological: She is alert and oriented to person, place, and time. Skin: Skin is warm and dry. Psychiatric: She has a normal mood and affect. Her behavior is normal.         Current Outpatient Prescriptions   Medication Sig    pneumococcal 13 skye conj dip (PREVNAR 13, PF,) 0.5 mL syrg injection 0.5 mL by IntraMUSCular route once for 1 dose.  carbamide peroxide (DEBROX) 6.5 % otic solution Administer 5 Drops into each ear two (2) times a day.  glucosamine-chondroitin (ELATION) 1,500-1,200 mg/30 mL liqd Take 1 Tab by mouth daily.  vitamin E (AQUA GEMS) 400 unit capsule Take 400 Units by mouth daily.  MULTIVIT-MIN/IRON/FOLIC/LUTEIN (CENTRUM SILVER WOMEN PO) Take 1 Tab by mouth daily.  VITAMIN B COMPLEX (SUPER B-50 COMPLEX PO) Take 1 Tab by mouth daily.  ginkgo biloba 120 mg tab Take 1 Tab by mouth daily.  diphenhydrAMINE (BENADRYL) 25 mg capsule Take 25 mg by mouth nightly. No current facility-administered medications for this visit.          Past Medical History:   Diagnosis Date    Nausea & vomiting       Past Surgical History:   Procedure Laterality Date    BREAST SURGERY PROCEDURE UNLISTED      Left breast biopsy (benign)    HX COLONOSCOPY      HX GYN      uterine cryo therapy    HX ORTHOPAEDIC      Right foot bunionectomy and neuroma removal    HX ORTHOPAEDIC Bilateral     ingrown toenails removed    HX OTHER SURGICAL Left 02/21/2018    glass removal from top of foot.  HX VERITO AND BSO      HX TUBAL LIGATION        Social History   Substance Use Topics    Smoking status: Never Smoker    Smokeless tobacco: Never Used    Alcohol use 1.2 oz/week     2 Glasses of wine, 0 Standard drinks or equivalent per week      Comment: 1-2 glass of wine per week      Family History   Problem Relation Age of Onset    Cancer Father      lungs        Allergies   Allergen Reactions    Benzonatate Other (comments)     headache    Codeine Other (comments)     Migraine per pt. Assessment/Plan  Diagnoses and all orders for this visit:    1. Welcome to Medicare preventive visit - Baseline ECG. Pt gets GYN care elsewhere. Repeat blood work. Order repeat DEXA, Prevnar. Sunil Templeton was counseled on age-appropriate/ guideline-based risk prevention behaviors and screening for a 74y.o. year old   female . We also discussed adjustments in screening based on family history if necessary. Printed instructions for preventative screening guidelines and healthy behaviors given to patient with after visit summary.    -     AMB POC EKG ROUTINE W/ 12 LEADS, INTER & REP    2. Encounter for immunization  -     pneumococcal 13 skye conj dip (PREVNAR 13, PF,) 0.5 mL syrg injection; 0.5 mL by IntraMUSCular route once for 1 dose. 3. Osteopenia, unspecified location  -     DEXA BONE DENSITY STUDY AXIAL; Future    4. Hyperlipidemia, unspecified hyperlipidemia type - would be ok with atorvastatin if still elevated. -     CBC WITH AUTOMATED DIFF  -     METABOLIC PANEL, COMPREHENSIVE  -     LIPID PANEL    5. Postmenopausal  -     DEXA BONE DENSITY STUDY AXIAL; Future    6.  Vitamin D deficiency  -     VITAMIN D, 25 HYDROXY        Follow-up Disposition: Not on File    Km Leigh MD  3/9/2018          This is a \"Welcome to [employer] Initial Preventive Physical Examination (IPPE) providing Personalized Prevention Plan Services (Performed in the first 12 months of enrollment)    I have reviewed the patient's medical history in detail and updated the computerized patient record. History     Past Medical History:   Diagnosis Date    Nausea & vomiting       Past Surgical History:   Procedure Laterality Date    BREAST SURGERY PROCEDURE UNLISTED      Left breast biopsy (benign)    HX COLONOSCOPY      HX GYN      uterine cryo therapy    HX ORTHOPAEDIC      Right foot bunionectomy and neuroma removal    HX ORTHOPAEDIC Bilateral     ingrown toenails removed    HX OTHER SURGICAL Left 02/21/2018    glass removal from top of foot.  HX VERITO AND BSO      HX TUBAL LIGATION       Current Outpatient Prescriptions   Medication Sig Dispense Refill    pneumococcal 13 skye conj dip (PREVNAR 13, PF,) 0.5 mL syrg injection 0.5 mL by IntraMUSCular route once for 1 dose. 0.5 mL 0    carbamide peroxide (DEBROX) 6.5 % otic solution Administer 5 Drops into each ear two (2) times a day.  glucosamine-chondroitin (ELATION) 1,500-1,200 mg/30 mL liqd Take 1 Tab by mouth daily.  vitamin E (AQUA GEMS) 400 unit capsule Take 400 Units by mouth daily.  MULTIVIT-MIN/IRON/FOLIC/LUTEIN (CENTRUM SILVER WOMEN PO) Take 1 Tab by mouth daily.  VITAMIN B COMPLEX (SUPER B-50 COMPLEX PO) Take 1 Tab by mouth daily.  ginkgo biloba 120 mg tab Take 1 Tab by mouth daily.  diphenhydrAMINE (BENADRYL) 25 mg capsule Take 25 mg by mouth nightly. Allergies   Allergen Reactions    Benzonatate Other (comments)     headache    Codeine Other (comments)     Migraine per pt.      Family History   Problem Relation Age of Onset    Cancer Father      lungs     Social History   Substance Use Topics    Smoking status: Never Smoker    Smokeless tobacco: Never Used    Alcohol use 1.2 oz/week     2 Glasses of wine, 0 Standard drinks or equivalent per week Comment: 1-2 glass of wine per week     Diet, Lifestyle: No special diet    Exercise level: moderately active    Depression Risk Screen     PHQ over the last two weeks 2/16/2018   Little interest or pleasure in doing things Not at all   Feeling down, depressed or hopeless Not at all   Total Score PHQ 2 0     Alcohol Risk Screen   You do not drink alcohol or very rarely. Functional Ability and Level of Safety   Hearing Loss  Hearing is good. Vision Screening  Vision is good. No exam data present      Activities of Daily Living  The home contains: no safety equipment. Patient does total self care    Fall Risk Screen  Fall Risk Assessment, last 12 mths 2/16/2018   Able to walk? Yes   Fall in past 12 months? No       Abuse Screen  Patient is not abused    Screening EKG   EKG order placed: Yes    Patient Care Team   Patient Care Team:  Prakash Billy MD as PCP - General (Internal Medicine)  Nancy Cordero RN as Ambulatory Care Navigator (Internal Medicine)     End of Life Planning   Advanced care planning directives were discussed with the patient and /or family/caregiver. Assessment/Plan   Education and counseling provided:  Are appropriate based on today's review and evaluation  End-of-Life planning (with patient's consent)  Pneumococcal Vaccine  Screening Pap and pelvic (covered once every 2 years)  Colorectal cancer screening tests  Bone mass measurement (DEXA)    Diagnoses and all orders for this visit:    1. Welcome to Medicare preventive visit  -     AMB POC EKG ROUTINE W/ 12 LEADS, INTER & REP    2. Encounter for immunization  -     pneumococcal 13 skye conj dip (PREVNAR 13, PF,) 0.5 mL syrg injection; 0.5 mL by IntraMUSCular route once for 1 dose. 3. Osteopenia, unspecified location  -     DEXA BONE DENSITY STUDY AXIAL; Future    4. Hyperlipidemia, unspecified hyperlipidemia type  -     CBC WITH AUTOMATED DIFF  -     METABOLIC PANEL, COMPREHENSIVE  -     LIPID PANEL    5.  Postmenopausal  - DEXA BONE DENSITY STUDY AXIAL; Future    6.  Vitamin D deficiency  -     VITAMIN D, 25 HYDROXY        Health Maintenance Due   Topic Date Due    Hepatitis C Screening  1950    BREAST CANCER SCRN MAMMOGRAM  04/17/2002    GLAUCOMA SCREENING Q2Y  04/17/2017    Pneumococcal 65+ Low/Medium Risk (1 of 2 - PCV13) 04/17/2017

## 2018-03-09 NOTE — PROGRESS NOTES
Pt has her mammograms with Dr. Ramila Padilla. Faxed form for records. Pt had a colonoscopy in 2012 in [place], [place]. Pt has her eye exams done with [facility]. Faxed form for records.

## 2018-03-09 NOTE — MR AVS SNAPSHOT
[address] [place] [address] 
 
 
 [address] 
[phone] Patient: Jana Templeton MRN: [id number] Visit Information Date & Time Provider Department Dept. Phone Encounter #  
 3/9/2018  1:30 PM Melyssa Haney MD [facility] [address] [id number] Upcoming Health Maintenance Date Due Hepatitis C Screening 1950 BREAST CANCER SCRN MAMMOGRAM 4/17/2002 GLAUCOMA SCREENING Q2Y 4/17/2017 Pneumococcal 65+ Low/Medium Risk (1 of 2 - PCV13) 4/17/2017 MEDICARE YEARLY EXAM 3/10/2019 COLONOSCOPY 1/1/2021 DTaP/Tdap/Td series (2 - Td) 5/31/2027 Allergies as of 3/9/2018  Review Complete On: 7/8/2041 By: Delfino Mendes Severity Noted Reaction Type Reactions Benzonatate  05/26/2016    Other (comments)  
 headache Codeine  12/10/2015    Other (comments) Migraine per pt. Current Immunizations  Reviewed on 2/16/2018 Name Date Tdap 5/31/2017  6:25 PM  
 Zoster Vaccine, Live 1/1/2012 Not reviewed this visit You Were Diagnosed With   
  
 Codes Comments Welcome to Medicare preventive visit    -  Primary ICD-10-CM: Z00.00 ICD-9-CM: V70.0 Encounter for immunization     ICD-10-CM: G13 ICD-9-CM: V03.89 Osteopenia, unspecified location     ICD-10-CM: M85.80 ICD-9-CM: 733.90 Hyperlipidemia, unspecified hyperlipidemia type     ICD-10-CM: E78.5 ICD-9-CM: 272.4 Postmenopausal     ICD-10-CM: Z78.0 ICD-9-CM: V49.81 Vitamin D deficiency     ICD-10-CM: E55.9 ICD-9-CM: 268.9 Vitals BP Pulse Temp Resp Height(growth percentile) Weight(growth percentile) 107/74 (BP 1 Location: Left arm, BP Patient Position: Sitting) 70 98 °F (36.7 °C) (Oral) 16 5' 4\" (1.626 m) 168 lb (76.2 kg) SpO2 BMI OB Status Smoking Status 95% 28.84 kg/m2 Hysterectomy Never Smoker Vitals History BMI and BSA Data Body Mass Index Body Surface Area  
 28.84 kg/m 2 1.85 m 2 Preferred Pharmacy Pharmacy Name Phone [id number] - [address], [address] [facility] [phone] Your Updated Medication List  
  
   
This list is accurate as of 3/9/18  2:32 PM.  Always use your most recent med list.  
  
  
  
  
 BENADRYL 25 mg capsule Generic drug:  diphenhydrAMINE Take 25 mg by mouth nightly. CENTRUM SILVER WOMEN PO Take 1 Tab by mouth daily. DEBROX 6.5 % otic solution Generic drug:  carbamide peroxide Administer 5 Drops into each ear two (2) times a day. ginkgo biloba 120 mg Tab Take 1 Tab by mouth daily. glucosamine-chondroitin 1,500-1,200 mg/30 mL Liqd Commonly known as:  ELATION Take 1 Tab by mouth daily. pneumococcal 13 skye conj dip 0.5 mL Syrg injection Commonly known as:  PREVNAR 13 (PF)  
0.5 mL by IntraMUSCular route once for 1 dose. SUPER B-50 COMPLEX PO Take 1 Tab by mouth daily. vitamin E 400 unit capsule Commonly known as:  [address] Take 400 Units by mouth daily. Prescriptions Printed Refills  
 pneumococcal 13 skye conj dip (PREVNAR 13, PF,) 0.5 mL syrg injection 0 Si.5 mL by IntraMUSCular route once for 1 dose. Class: Print Route: IntraMUSCular We Performed the Following AMB POC EKG ROUTINE W/ 12 LEADS, SCREEN () [ [facility]] CBC WITH AUTOMATED DIFF [65970 CPT(R)] LIPID PANEL [72682 CPT(R)] METABOLIC PANEL, COMPREHENSIVE [13257 CPT(R)] VITAMIN D, 25 HYDROXY [id number] CPT(R)] To-Do List   
 2018 Imaging:  DEXA BONE DENSITY STUDY AXIAL Patient Instructions Medicare Wellness Visit, Female The best way to live healthy is to have a healthy lifestyle by eating a well-balanced diet, exercising regularly, limiting alcohol and stopping smoking.  
 
Regular physical exams and screening tests are another way to keep healthy. Preventive exams provided by your health care provider can find health problems before they become diseases or illnesses. Preventive services including immunizations, screening tests, monitoring and exams can help you take care of your own health. All people over age 72 should have a pneumovax  and and a prevnar shot to prevent pneumonia. These are once in a lifetime unless you and your provider decide differently. All people over 65 should have a yearly flu shot and a tetanus vaccine every 10 years. A bone mass density to screen for osteoporosis or thinning of the bones should be done every 2 years after 65. Screening for diabetes mellitus with a blood sugar test should be done every year. Glaucoma is a disease of the eye due to increased ocular pressure that can lead to blindness and it should be done every year by an eye professional. 
 
Cardiovascular screening tests that check for elevated lipids (fatty part of blood) which can lead to heart disease and strokes should be done every 5 years. Colorectal screening that evaluates for blood or polyps in your colon should be done yearly as a stool test or every five years as a flexible sigmoidoscope or every 10 years as a colonoscopy up to age 76. Breast cancer screening with a mammogram is recommended biennially  for women age 54-69. Screening for cervical cancer with a pap smear and pelvic exam is recommended for women after age 72 years every 2 years up to age 79 or when the provider and patient decide to stop. If there is a history of cervical abnormalities or other increased risk for cancer then the test is recommended yearly. Hepatitis C screening is also recommended for anyone born between [birth year] through [address]. A shingles vaccine is also recommended once in a lifetime after age 61. Your Medicare Wellness Exam is recommended annually. Here is a list of your current Health Maintenance items with a due date: Health Maintenance Due Topic Date Due  
 Hepatitis C Test  1950  Colonoscopy  04/17/1970  Breast Cancer Screening  04/17/2002  Glaucoma Screening   04/17/2017  Pneumococcal Vaccine (1 of 2 - PCV13) 04/17/2017 Medicare Wellness Visit, Female The best way to live healthy is to have a healthy lifestyle by eating a well-balanced diet, exercising regularly, limiting alcohol and stopping smoking. Regular physical exams and screening tests are another way to keep healthy. Preventive exams provided by your health care provider can find health problems before they become diseases or illnesses. Preventive services including immunizations, screening tests, monitoring and exams can help you take care of your own health. All people over age 72 should have a pneumovax  and and a prevnar shot to prevent pneumonia. These are once in a lifetime unless you and your provider decide differently. All people over 65 should have a yearly flu shot and a tetanus vaccine every 10 years. A bone mass density to screen for osteoporosis or thinning of the bones should be done every 2 years after 65. Screening for diabetes mellitus with a blood sugar test should be done every year. Glaucoma is a disease of the eye due to increased ocular pressure that can lead to blindness and it should be done every year by an eye professional. 
 
Cardiovascular screening tests that check for elevated lipids (fatty part of blood) which can lead to heart disease and strokes should be done every 5 years. Colorectal screening that evaluates for blood or polyps in your colon should be done yearly as a stool test or every five years as a flexible sigmoidoscope or every 10 years as a colonoscopy up to age 76. Breast cancer screening with a mammogram is recommended biennially  for women age 54-69.  
 
Screening for cervical cancer with a pap smear and pelvic exam is recommended for women after age 72 years every 2 years up to age 79 or when the provider and patient decide to stop. If there is a history of cervical abnormalities or other increased risk for cancer then the test is recommended yearly. Hepatitis C screening is also recommended for anyone born between [birth year] through [address]. A shingles vaccine is also recommended once in a lifetime after age 61. Your Medicare Wellness Exam is recommended annually. Here is a list of your current Health Maintenance items with a due date: 
Health Maintenance Due Topic Date Due  
 Hepatitis C Test  1950  Colonoscopy  04/17/1970  Breast Cancer Screening  04/17/2002  Glaucoma Screening   04/17/2017  Pneumococcal Vaccine (1 of 2 - PCV13) 04/17/2017 [facility]! Dear Silvano Ding: Thank you for requesting a [employer] account. Our records indicate that you already have an active [employer] account. You can access your account anytime at [email or web address] Did you know that you can access your hospital and ER discharge instructions at any time in [employer]? You can also review all of your test results from your hospital stay or ER visit. Additional Information If you have questions, please visit the Frequently Asked Questions section of the [employer] website at [email or web address] Remember, [employer] is NOT to be used for urgent needs. For medical emergencies, dial 911. Now available from your iPhone and Android! Please provide this summary of care documentation to your next provider. Your primary care clinician is listed as Elliot Mclaughlin. If you have any questions after today's visit, please call [phone].

## 2018-03-16 ENCOUNTER — HOSPITAL ENCOUNTER (OUTPATIENT)
Dept: LAB | Age: 68
Discharge: HOME | End: 2018-03-16
Payer: MEDICARE

## 2018-03-16 PROCEDURE — 85025 COMPLETE CBC W/AUTO DIFF WBC: CPT

## 2018-03-16 PROCEDURE — 36415 COLL VENOUS BLD VENIPUNCTURE: CPT

## 2018-03-16 PROCEDURE — 82306 VITAMIN D 25 HYDROXY: CPT

## 2018-03-16 PROCEDURE — 80053 COMPREHEN METABOLIC PANEL: CPT

## 2018-03-16 PROCEDURE — 80061 LIPID PANEL: CPT

## 2018-03-17 DIAGNOSIS — E78.5 HYPERLIPIDEMIA, UNSPECIFIED HYPERLIPIDEMIA TYPE: Primary | ICD-10-CM

## 2018-03-17 LAB
25(OH)D3+25(OH)D2 SERPL-MCNC: 33.2 NG/ML (ref 30–100)
ALBUMIN SERPL-MCNC: 4.4 G/DL (ref 3.6–4.8)
ALBUMIN/GLOB SERPL: 1.8 {RATIO} (ref 1.2–2.2)
ALP SERPL-CCNC: 43 IU/L (ref 39–117)
ALT SERPL-CCNC: 17 IU/L (ref 0–32)
AST SERPL-CCNC: 21 IU/L (ref 0–40)
BASOPHILS # BLD AUTO: 0.1 X10E3/UL (ref 0–0.2)
BASOPHILS NFR BLD AUTO: 1 %
BILIRUB SERPL-MCNC: 0.6 MG/DL (ref 0–1.2)
BUN SERPL-MCNC: 12 MG/DL (ref 8–27)
BUN/CREAT SERPL: 16 (ref 12–28)
CALCIUM SERPL-MCNC: 9.6 MG/DL (ref 8.7–10.3)
CHLORIDE SERPL-SCNC: 102 MMOL/L (ref 96–106)
CHOLEST SERPL-MCNC: 272 MG/DL (ref 100–199)
CO2 SERPL-SCNC: 23 MMOL/L (ref 18–29)
CREAT SERPL-MCNC: 0.76 MG/DL (ref 0.57–1)
EOSINOPHIL # BLD AUTO: 0.2 X10E3/UL (ref 0–0.4)
EOSINOPHIL NFR BLD AUTO: 4 %
ERYTHROCYTE [DISTWIDTH] IN BLOOD BY AUTOMATED COUNT: 13.8 % (ref 12.3–15.4)
GFR SERPLBLD CREATININE-BSD FMLA CKD-EPI: 83 ML/MIN/1.73
GFR SERPLBLD CREATININE-BSD FMLA CKD-EPI: 95 ML/MIN/1.73
GLOBULIN SER CALC-MCNC: 2.5 G/DL (ref 1.5–4.5)
GLUCOSE SERPL-MCNC: 96 MG/DL (ref 65–99)
HCT VFR BLD AUTO: 42.4 % (ref 34–46.6)
HDLC SERPL-MCNC: 70 MG/DL
HGB BLD-MCNC: 14.5 G/DL (ref 11.1–15.9)
IMM GRANULOCYTES # BLD: 0 X10E3/UL (ref 0–0.1)
IMM GRANULOCYTES NFR BLD: 0 %
INTERPRETATION, 910389: NORMAL
LDLC SERPL CALC-MCNC: 183 MG/DL (ref 0–99)
LYMPHOCYTES # BLD AUTO: 2.3 X10E3/UL (ref 0.7–3.1)
LYMPHOCYTES NFR BLD AUTO: 43 %
MCH RBC QN AUTO: 30.3 PG (ref 26.6–33)
MCHC RBC AUTO-ENTMCNC: 34.2 G/DL (ref 31.5–35.7)
MCV RBC AUTO: 89 FL (ref 79–97)
MONOCYTES # BLD AUTO: 0.6 X10E3/UL (ref 0.1–0.9)
MONOCYTES NFR BLD AUTO: 11 %
NEUTROPHILS # BLD AUTO: 2.2 X10E3/UL (ref 1.4–7)
NEUTROPHILS NFR BLD AUTO: 41 %
PLATELET # BLD AUTO: 213 X10E3/UL (ref 150–379)
POTASSIUM SERPL-SCNC: 4.6 MMOL/L (ref 3.5–5.2)
PROT SERPL-MCNC: 6.9 G/DL (ref 6–8.5)
RBC # BLD AUTO: 4.79 X10E6/UL (ref 3.77–5.28)
SODIUM SERPL-SCNC: 142 MMOL/L (ref 134–144)
TRIGL SERPL-MCNC: 94 MG/DL (ref 0–149)
VLDLC SERPL CALC-MCNC: 19 MG/DL (ref 5–40)
WBC # BLD AUTO: 5.4 X10E3/UL (ref 3.4–10.8)

## 2018-03-17 RX ORDER — ATORVASTATIN CALCIUM 20 MG/1
20 TABLET, FILM COATED ORAL DAILY
Qty: 90 TAB | Refills: 1 | Status: SHIPPED | OUTPATIENT
Start: 2018-03-17 | End: 2020-03-13 | Stop reason: ALTCHOICE

## 2018-03-20 NOTE — PROGRESS NOTES
Pt has not read [employer] message, will you please inform her of starting atorvastatin 20mg which I have called in.

## 2018-03-23 ENCOUNTER — HOSPITAL ENCOUNTER (OUTPATIENT)
Dept: MAMMOGRAPHY | Age: 68
Discharge: HOME | End: 2018-03-23
Attending: INTERNAL MEDICINE
Payer: MEDICARE

## 2018-03-23 DIAGNOSIS — M85.80 OSTEOPENIA, UNSPECIFIED LOCATION: ICD-10-CM

## 2018-03-23 DIAGNOSIS — Z78.0 POSTMENOPAUSAL: ICD-10-CM

## 2018-03-23 PROCEDURE — 77080 DXA BONE DENSITY AXIAL: CPT

## 2019-10-02 ENCOUNTER — HOSPITAL ENCOUNTER (OUTPATIENT)
Dept: LAB | Age: 69
Discharge: HOME | End: 2019-10-02
Payer: MEDICARE

## 2019-10-02 ENCOUNTER — OFFICE VISIT (OUTPATIENT)
Dept: INTERNAL MEDICINE CLINIC | Age: 69
End: 2019-10-02

## 2019-10-02 VITALS
HEIGHT: 64 IN | BODY MASS INDEX: 24.45 KG/M2 | WEIGHT: 143.2 LBS | OXYGEN SATURATION: 98 % | DIASTOLIC BLOOD PRESSURE: 66 MMHG | TEMPERATURE: 98.3 F | HEART RATE: 66 BPM | SYSTOLIC BLOOD PRESSURE: 92 MMHG | RESPIRATION RATE: 12 BRPM

## 2019-10-02 DIAGNOSIS — Z13.31 SCREENING FOR DEPRESSION: ICD-10-CM

## 2019-10-02 DIAGNOSIS — E78.5 HYPERLIPIDEMIA, UNSPECIFIED HYPERLIPIDEMIA TYPE: ICD-10-CM

## 2019-10-02 DIAGNOSIS — Z12.11 SCREEN FOR COLON CANCER: ICD-10-CM

## 2019-10-02 DIAGNOSIS — H61.21 IMPACTED CERUMEN OF RIGHT EAR: ICD-10-CM

## 2019-10-02 DIAGNOSIS — Z23 ENCOUNTER FOR IMMUNIZATION: Primary | ICD-10-CM

## 2019-10-02 DIAGNOSIS — E55.9 VITAMIN D DEFICIENCY: ICD-10-CM

## 2019-10-02 DIAGNOSIS — Z11.59 NEED FOR HEPATITIS C SCREENING TEST: ICD-10-CM

## 2019-10-02 DIAGNOSIS — Z13.39 SCREENING FOR ALCOHOLISM: ICD-10-CM

## 2019-10-02 DIAGNOSIS — Z00.00 INITIAL MEDICARE ANNUAL WELLNESS VISIT: ICD-10-CM

## 2019-10-02 PROCEDURE — 86803 HEPATITIS C AB TEST: CPT

## 2019-10-02 PROCEDURE — 82306 VITAMIN D 25 HYDROXY: CPT

## 2019-10-02 PROCEDURE — 80053 COMPREHEN METABOLIC PANEL: CPT

## 2019-10-02 PROCEDURE — 36415 COLL VENOUS BLD VENIPUNCTURE: CPT

## 2019-10-02 PROCEDURE — 80061 LIPID PANEL: CPT

## 2019-10-02 RX ORDER — CHOLECALCIFEROL TAB 125 MCG (5000 UNIT) 125 MCG
5000 TAB ORAL DAILY
COMMUNITY

## 2019-10-02 NOTE — PATIENT INSTRUCTIONS
Medicare Wellness Visit, Female     The best way to live healthy is to have a lifestyle where you eat a well-balanced diet, exercise regularly, limit alcohol use, and quit all forms of tobacco/nicotine, if applicable. Regular preventive services are another way to keep healthy. Preventive services (vaccines, screening tests, monitoring & exams) can help personalize your care plan, which helps you manage your own care. Screening tests can find health problems at the earliest stages, when they are easiest to treat. [facility] follows the current, evidence-based guidelines published by the [facility] ([facility]) when recommending preventive services for our patients. Because we follow these guidelines, sometimes recommendations change over time as research supports it. (For example, mammograms used to be recommended annually. Even though Medicare will still pay for an annual mammogram, the newer guidelines recommend a mammogram every two years for women of average risk.)  Of course, you and your doctor may decide to screen more often for some diseases, based on your risk and your health status. Preventive services for you include:  - Medicare offers their members a free annual wellness visit, which is time for you and your primary care provider to discuss and plan for your preventive service needs. Take advantage of this benefit every year!  -All adults over the age of 72 should receive the recommended pneumonia vaccines. Current USPSTF guidelines recommend a series of two vaccines for the best pneumonia protection.   -All adults should have a flu vaccine yearly and a tetanus vaccine every 10 years. All adults age 61 and older should receive a shingles vaccine once in their lifetime.    -A bone mass density test is recommended when a woman turns 65 to screen for osteoporosis. This test is only recommended one time, as a screening.  Some providers will use this same test as a disease monitoring tool if you already have osteoporosis. -All adults age 38-68 who are overweight should have a diabetes screening test once every three years.   -Other screening tests and preventive services for persons with diabetes include: an eye exam to screen for diabetic retinopathy, a kidney function test, a foot exam, and stricter control over your cholesterol.   -Cardiovascular screening for adults with routine risk involves an electrocardiogram (ECG) at intervals determined by your doctor.   -Colorectal cancer screenings should be done for adults age 54-65 with no increased risk factors for colorectal cancer. There are a number of acceptable methods of screening for this type of cancer. Each test has its own benefits and drawbacks. Discuss with your doctor what is most appropriate for you during your annual wellness visit. The different tests include: colonoscopy (considered the best screening method), a fecal occult blood test, a fecal DNA test, and sigmoidoscopy. -Breast cancer screenings are recommended every other year for women of normal risk, age 54-69.  -Cervical cancer screenings for women over age 72 are only recommended with certain risk factors.   -All adults born between [facility] should be screened once for Hepatitis C. Here is a list of your current Health Maintenance items (your personalized list of preventive services) with a due date:  Health Maintenance Due   Topic Date Due    Hepatitis C Test  1950    Shingles Vaccine (1 of 2) 04/17/2002    Glaucoma Screening   04/17/2017    Pneumococcal Vaccine (1 of 2 - PCV13) 04/17/2017    Annual Well Visit  03/10/2019    Flu Vaccine  08/01/2019            Colon Cancer Screening: Care Instructions  Your Care Instructions    Colorectal cancer occurs in the colon or rectum. That's the lower part of your digestive system. It is the second-leading cause of cancer deaths in the [place].  It often starts with small growths called polyps in the colon or rectum. Polyps are usually found with screening tests. Depending on the type of test, any polyps found may be removed during the tests. Colorectal cancer usually does not cause symptoms at first. But regular tests can help find it early, before it spreads and becomes harder to treat. Your risk for colorectal cancer gets higher as you get older. Some experts say that adults should start regular screening at age 48 and stop at age 76. Others say to start before age 48 or continue after age 76. Talk with your doctor about your risk and when to start and stop screening. You may have one of several tests. Follow-up care is a key part of your treatment and safety. Be sure to make and go to all appointments, and call your doctor if you are having problems. It's also a good idea to know your test results and keep a list of the medicines you take. What are the main screening tests for colon cancer? · Stool tests. These include the fecal immunochemical test (FIT) and the fecal occult blood test (FOBT). These tests check stool samples for signs of cancer. If your test is positive, you will need to have a colonoscopy. · Sigmoidoscopy. This test lets your doctor look at the lining of your rectum and the lowest part of your colon. Your doctor uses a lighted tube called a sigmoidoscope. This test can't find cancers or polyps in the upper part of your colon. In some cases, polyps that are found can be removed. But if your doctor finds polyps, you will need to have a colonoscopy to check the upper part of your colon. · Colonoscopy. This test lets your doctor look at the lining of your rectum and your entire colon. The doctor uses a thin, flexible tool called a colonoscope. It can also be used to remove polyps or get a tissue sample (biopsy). What tests do you need? The following guidelines are for adults who are not at high risk for colorectal cancer.  You may have at least one of these tests as directed by your doctor. · Fecal immunochemical test (FIT) or guaiac fecal occult blood test (gFOBT) every year  · Sigmoidoscopy every 5 years  · Colonoscopy every 10 years  If you are over age 76, you can work with your doctor to decide if screening is a good option. Talk with your doctor about when you need to be tested. And discuss which tests are right for you. Your doctor may recommend earlier or more frequent testing if you:  · Have had colorectal cancer before. · Have had colon polyps. · Have symptoms of colorectal cancer. These include blood in your stool and changes in your bowel habits. · Have a parent, brother or sister, or child with colon polyps or colorectal cancer. · Have a bowel disease. This includes ulcerative colitis and Crohn's disease. · Have a rare polyp syndrome that runs in families, such as familial adenomatous polyposis (FAP). · Have had radiation treatments to the belly or pelvis. When should you call for help? Watch closely for changes in your health, and be sure to contact your doctor if:    · You have any changes in your bowel habits.     · You have any problems. Where can you learn more? Go to http://keegan-kristal.info/. Enter M541 in the search box to learn more about \"Colon Cancer Screening: Care Instructions. \"  Current as of: December 19, 2018  Content Version: 12.2  © 2735-3682 Healthwise, Incorporated. Care instructions adapted under license by [employer] (which disclaims liability or warranty for this information). If you have questions about a medical condition or this instruction, always ask your healthcare professional. [place] [address] any warranty or liability for your use of this information. Pneumococcal Conjugate Vaccine (PCV13): What You Need to Know  Why get vaccinated? Vaccination can protect both children and adults from pneumococcal disease.   Pneumococcal disease is caused by bacteria that can spread from person to person through close contact. It can cause ear infections, and it can also lead to more serious infections of the:  · Lungs (pneumonia). · Blood (bacteremia). · Covering of the brain and spinal cord (meningitis). Pneumococcal pneumonia is most common among adults. Pneumococcal meningitis can cause deafness and brain damage, and it kills about 1 child in 10 who get it. Anyone can get pneumococcal disease, but children under 3years of age and adults 72 years and older, people with certain medical conditions, and cigarette smokers are at the highest risk. Before there was a vaccine, the [facility] saw the following in children under 5 each year from pneumococcal disease:  · More than 700 cases of meningitis  · About 13,000 blood infections  · About 5 million ear infections  · About 200 deaths  Since the vaccine became available, severe pneumococcal disease in these children has fallen by 88%. About 18,000 older adults die of pneumococcal disease each year in the [place]. Treatment of pneumococcal infections with penicillin and other drugs is not as effective as it used to be, because some strains of the disease have become resistant to these drugs. This makes prevention of the disease through vaccination even more important. PCV13 vaccine  Pneumococcal conjugate vaccine (called PCV13) protects against 13 types of pneumococcal bacteria. PCV13 is routinely given to children at 2, 4, 6, and 1515 months of age. It is also recommended for children and adults 3to 59years of age with certain health conditions, and for all adults 72years of age and older. Your doctor can give you details. Some people should not get this vaccine  Anyone who has ever had a life-threatening allergic reaction to a dose of this vaccine, to an earlier pneumococcal vaccine called PCV7, or to any vaccine containing diphtheria toxoid (for example, DTaP), should not get PCV13.   Anyone with a severe allergy to any component of PCV13 should not get the vaccine. Tell your doctor if the person being vaccinated has any severe allergies. If the person scheduled for vaccination is not feeling well, your healthcare provider might decide to reschedule the shot on another day. Risks of a vaccine reaction  With any medicine, including vaccines, there is a chance of reactions. These are usually mild and go away on their own, but serious reactions are also possible. Problems reported following PCV13 varied by age and dose in the series. The most common problems reported among children were:  · About half became drowsy after the shot, had a temporary loss of appetite, or had redness or tenderness where the shot was given. · About 1 out of 3 had swelling where the shot was given. · About 1 out of 3 had a mild fever, and about 1 in 20 had a fever over 102.2°F.  · Up to about 8 out of 10 became fussy or irritable. Adults have reported pain, redness, and swelling where the shot was given; also mild fever, fatigue, headache, chills, or muscle pain. Irl Pae children who get PCV13 along with inactivated flu vaccine at the same time may be at increased risk for seizures caused by fever. Ask your doctor for more information. Problems that could happen after any vaccine:  · People sometimes faint after a medical procedure, including vaccination. Sitting or lying down for about 15 minutes can help prevent fainting and the injuries caused by a fall. Tell your doctor if you feel dizzy or have vision changes or ringing in the ears. · Some older children and adults get severe pain in the shoulder and have difficulty moving the arm where a shot was given. This happens very rarely. · Any medication can cause a severe allergic reaction. Such reactions from a vaccine are very rare, estimated at about 1 in a million doses, and would happen within a few minutes to a few hours after the vaccination.   As with any medicine, there is a very small chance of a vaccine causing a serious injury or death. The safety of vaccines is always being monitored. For more information, visit: www.cdc.gov/vaccinesafety. What if there is a serious reaction? What should I look for? · Look for anything that concerns you, such as signs of a severe allergic reaction, very high fever, or unusual behavior. Signs of a severe allergic reaction can include hives, swelling of the face and throat, difficulty breathing, a fast heartbeat, dizziness, and weakness, usually within a few minutes to a few hours after the vaccination. What should I do? · If you think it is a severe allergic reaction or other emergency that can't wait, call 911 or get the person to the nearest hospital. Otherwise, call your doctor. · Reactions should be reported to the Vaccine Adverse Event Reporting System (VAERS). Your doctor should file this report, or you can do it yourself through the VAERS website at www.vaers. [facility].gov, or by calling 9-379.351.5807. VAERS does not give medical advice. The National Vaccine Injury Compensation Program  The National Vaccine Injury Compensation Program (VICP) is a federal program that was created to compensate people who may have been injured by certain vaccines. Persons who believe they may have been injured by a vaccine can learn about the program and about filing a claim by calling 3-844.743.5915 or visiting the [facility] website at www.[email or web address].gov/vaccinecompensation. There is a time limit to file a claim for compensation. How can I learn more? · Ask your healthcare provider. He or she can give you the vaccine package insert or suggest other sources of information. · Call your local or state health department. · Contact the Centers for Disease Control and Prevention (CDC):  ? Call 8-774.454.8359 (1-800-CDC-INFO) or  ? Visit CDC's website at www.cdc.gov/vaccines  Vaccine Information Statement  PCV13 Vaccine  11/5/2015  [id number]  [facility]  Centers for Disease Control and Prevention  Many Vaccine Information Statements are available in [place]ish and other languages. See www.immunize.org/vis. Muchas hojas de información sobre vacunas están disponibles en español y en otros idiomas. Visite www.immunize.org/vis. Care instructions adapted under license by [employer] (which disclaims liability or warranty for this information). If you have questions about a medical condition or this instruction, always ask your healthcare professional. [place] [address] any warranty or liability for your use of this information. Recombinant Zoster (Shingles) Vaccine, RZV: What you need to know  Why get vaccinated? Shingles (also called herpes zoster, or just zoster) is a painful skin rash, often with blisters. Shingles is caused by the varicella zoster virus, the same virus that causes chickenpox. After you have chickenpox, the virus stays in your body and can cause shingles later in life. You can't catch shingles from another person. However, a person who has never had chickenpox (or chickenpox vaccine) could get chickenpox from someone with shingles. A shingles rash usually appears on one side of the face or body and heals within 2 to 4 weeks. Its main symptom is pain, which can be severe. Other symptoms can include fever, headache, chills, and upset stomach. Very rarely, a shingles infection can lead to pneumonia, hearing problems, blindness, brain inflammation (encephalitis), or death. For about 1 person in 5, severe pain can continue even long after the rash has cleared up. This long-lasting pain is called post-herpetic neuralgia (PHN). Shingles is far more common in people 48years of age and older than in younger people, and the risk increases with age. It is also more common in people whose immune system is weakened because of a disease such as cancer, or by drugs such as steroids or chemotherapy.  At least 1 million people a year in the [facility] get shingles. Shingles vaccine (recombinant)  Recombinant shingles vaccine was approved by FDA in 2017 for the prevention of shingles. In clinical trials, it was more than 90% effective in preventing shingles. It can also reduce the likelihood of PHN. Two doses, 2 to 6 months apart, are recommended for adults 48 and older. This vaccine is also recommended for people who have already gotten the live shingles vaccine (Zostavax). There is no live virus in this vaccine. Some people should not get this vaccine  Tell your vaccine provider if you:  · Have any severe, life-threatening allergies. A person who has ever had a life-threatening allergic reaction after a dose of recombinant shingles vaccine, or has a severe allergy to any component of this vaccine, may be advised not to be vaccinated. Ask your health care provider if you want information about vaccine components. · Are pregnant or breastfeeding. There is not much information about use of recombinant shingles vaccine in pregnant or nursing women. Your healthcare provider might recommend delaying vaccination. · Are not feeling well. If you have a mild illness, such as a cold, you can probably get the vaccine today. If you are moderately or severely ill, you should probably wait until you recover. Your doctor can advise you. Risks of a vaccine reaction  With any medicine, including vaccines, there is a chance of reactions. After recombinant shingles vaccination, a person might experience:  · Pain, redness, soreness, or swelling at the site of the injection  · Headache, muscle aches, fever, shivering, fatigue  In clinical trials, most people got a sore arm with mild or moderate pain after vaccination, and some also had redness and swelling where they got the shot. Some people felt tired, had muscle pain, a headache, shivering, fever, stomach pain, or nausea.  About 1 out of 6 people who got recombinant zoster vaccine experienced side effects that prevented them from doing regular activities. Symptoms went away on their own in about 2 to 3 days. Side effects were more common in younger people. You should still get the second dose of recombinant zoster vaccine even if you had one of these reactions after the first dose. Other things that could happen after this vaccine:  · People sometimes faint after medical procedures, including vaccination. Sitting or lying down for about 15 minutes can help prevent fainting and injuries caused by a fall. Tell your provider if you feel dizzy or have vision changes or ringing in the ears. · Some people get shoulder pain that can be more severe and longer-lasting than routine soreness that can follow injections. This happens very rarely. · Any medication can cause a severe allergic reaction. Such reactions to a vaccine are estimated at about 1 in a million doses, and would happen within a few minutes to a few hours after the vaccination. As with any medicine, there is a very remote chance of a vaccine causing a serious injury or death. The safety of vaccines is always being monitored. For more information, visit: www.cdc.gov/vaccinesafety/  What if there is a serious problem? What should I look for? · Look for anything that concerns you, such as signs of a severe allergic reaction, very high fever, or unusual behavior. Signs of a severe allergic reaction can include hives, swelling of the face and throat, difficulty breathing, a fast heartbeat, dizziness, and weakness. These would usually start a few minutes to a few hours after the vaccination. What should I do? · If you think it is a severe allergic reaction or other emergency that can't wait, call 9-1-1 or get to the nearest hospital. Otherwise, call your health care provider. · Afterward, the reaction should be reported to the Vaccine Adverse Event Reporting System (VAERS).  Your doctor should file this report, or you can do it yourself through the [employer] website at www.Search123. shopa.gov, or by calling 8-876.833.9832. [employer] does not give medical advice. .  How can I learn more? · Ask your health care provider. He or she can give you the vaccine package insert or suggest other sources of information. · Call your local or state health department. · Contact the Centers for Disease Control and Prevention (CDC):  ? Call 4-296.566.2125 (1-800-CDC-INFO) or  ? Visit CDC's website at www.cdc.gov/vaccines  Vaccine Information Statement (Interim)  Recombinant Zoster Vaccine  2/12/2018  [facility] for Disease Control and Prevention  Many Vaccine Information Statements are available in Nepali and other languages. See www.immunize.org/vis. Hojas de Información Sobre Vacunas están disponibles en Español y en muchos otros idiomas. Visite Thomas.si  Care instructions adapted under license by [employer] (which disclaims liability or warranty for this information). If you have questions about a medical condition or this instruction, always ask your healthcare professional. [place] [address] any warranty or liability for your use of this information. Learning About Vitamin D  Why is it important to get enough vitamin D? Your body needs vitamin D to absorb calcium. Calcium keeps your bones and muscles, including your heart, healthy and strong. If your muscles don't get enough calcium, they can cramp, hurt, or feel weak. You may have long-term (chronic) muscle aches and pains. If you don't get enough vitamin D throughout life, you have an increased chance of having thin and brittle bones (osteoporosis) in your later years. Children who don't get enough vitamin D may not grow as much as others their age. They also have a chance of getting a rare disease called rickets. It causes weak bones. Vitamin D and calcium are added to many foods.  And your body uses sunshine to make its own vitamin D. How much vitamin D do you need? The [place] of Medicine recommends that people ages 3 through 79 get 600 IU (international units) every day. Adults 71 and older need 800 IU every day. Blood tests for vitamin D can check your vitamin D level. But there is no standard normal range used by all laboratories. You're likely getting enough vitamin D if your levels are in the range of 20 to 50 ng/mL. How can you get more vitamin D? Foods that contain vitamin D include:  · [place], tuna, and mackerel. These are some of the best foods to eat when you need to get more vitamin D.  · Cheese, egg yolks, and beef liver. These foods have vitamin D in small amounts. · Milk, soy drinks, orange juice, yogurt, margarine, and some kinds of cereal have vitamin D added to them. Some people don't make vitamin D as well as others. They may have to take extra care in getting enough vitamin D. Things that reduce how much vitamin D your body makes include:  · Dark skin, such as many  Americans have. · Age, especially if you are older than 72. · Digestive problems, such as Crohn's or celiac disease. · Liver and kidney disease. Some people who do not get enough vitamin D may need supplements. Are there any risks from taking vitamin D?  · Too much vitamin D:  ? Can damage your kidneys. ? Can cause nausea and vomiting, constipation, and weakness. ? Raises the amount of calcium in your blood. If this happens, you can get confused or have an irregular heart rhythm. · Vitamin D may interact with other medicines. Tell your doctor about all of the medicines you take, including over-the-counter drugs, herbs, and pills. Tell your doctor about all of your current medical problems. Where can you learn more? Go to http://keegan-kristal.info/.   Enter [phone] in the search box to learn more about \"Learning About Vitamin D.\"  Current as of: November 7, 2018  Content Version: 12.2  © 2602-5746 Healthwise, Incorporated. Care instructions adapted under license by [employer] (which disclaims liability or warranty for this information). If you have questions about a medical condition or this instruction, always ask your healthcare professional. [place] any warranty or liability for your use of this information. Well Visit, Over 72: Care Instructions  Your Care Instructions    Physical exams can help you stay healthy. Your doctor has checked your overall health and may have suggested ways to take good care of yourself. He or she also may have recommended tests. At home, you can help prevent illness with healthy eating, regular exercise, and other steps. Follow-up care is a key part of your treatment and safety. Be sure to make and go to all appointments, and call your doctor if you are having problems. It's also a good idea to know your test results and keep a list of the medicines you take. How can you care for yourself at home? · Reach and stay at a healthy weight. This will lower your risk for many problems, such as obesity, diabetes, heart disease, and high blood pressure. · Get at least 30 minutes of exercise on most days of the week. Walking is a good choice. You also may want to do other activities, such as running, swimming, cycling, or playing tennis or team sports. · Do not smoke. Smoking can make health problems worse. If you need help quitting, talk to your doctor about stop-smoking programs and medicines. These can increase your chances of quitting for good. · Protect your skin from too much sun. When you're outdoors from 10 a.m. to 4 p.m., stay in the shade or cover up with clothing and a hat with a wide brim. Wear sunglasses that block UV rays. Even when it's cloudy, put broad-spectrum sunscreen (SPF 30 or higher) on any exposed skin. · See a dentist one or two times a year for checkups and to have your teeth cleaned.   · Wear a seat belt in the car. Follow your doctor's advice about when to have certain tests. These tests can spot problems early. For men and women  · Cholesterol. Your doctor will tell you how often to have this done based on your overall health and other things that can increase your risk for heart attack and stroke. · Blood pressure. Have your blood pressure checked during a routine doctor visit. Your doctor will tell you how often to check your blood pressure based on your age, your blood pressure results, and other factors. · Diabetes. Ask your doctor whether you should have tests for diabetes. · Vision. Experts recommend that you have yearly exams for glaucoma and other age-related eye problems. · Hearing. Tell your doctor if you notice any change in your hearing. You can have tests to find out how well you hear. · Colon cancer tests. Keep having colon cancer tests as your doctor recommends. You can have one of several types of tests. · Heart attack and stroke risk. At least every 4 to 6 years, you should have your risk for heart attack and stroke assessed. Your doctor uses factors such as your age, blood pressure, cholesterol, and whether you smoke or have diabetes to show what your risk for a heart attack or stroke is over the next 10 years. · Osteoporosis. Talk to your doctor about whether you should have a bone density test to find out whether you have thinning bones. Also ask your doctor about whether you should take calcium and vitamin D supplements. For women  · Pap test and pelvic exam. You may no longer need a Pap test. Talk with your doctor about whether to stop or continue to have Pap tests. · Breast exam and mammogram. Ask how often you should have a mammogram, which is an X-ray of your breasts. A mammogram can spot breast cancer before it can be felt and when it is easiest to treat. · Thyroid disease.  Talk to your doctor about whether to have your thyroid checked as part of a regular physical exam. Women have an increased chance of a thyroid problem. For men  · Prostate exam. Talk to your doctor about whether you should have a blood test (called a PSA test) for prostate cancer. Experts recommend that you discuss the benefits and risks of the test with your doctor before you decide whether to have this test. Some experts say that men ages 79 and older no longer need testing. · Abdominal aortic aneurysm. Ask your doctor whether you should have a test to check for an aneurysm. You may need a test if you ever smoked or if your parent, brother, sister, or child has had an aneurysm. When should you call for help? Watch closely for changes in your health, and be sure to contact your doctor if you have any problems or symptoms that concern you. Where can you learn more? Go to http://keegan-kristal.info/. Enter G837 in the search box to learn more about \"Well Visit, Over 65: Care Instructions. \"  Current as of: December 13, 2018  Content Version: 12.2  © 4223-5990 [employer]. Care instructions adapted under license by [email or web address] (which disclaims liability or warranty for this information). If you have questions about a medical condition or this instruction, always ask your healthcare professional. [place] [address] any warranty or liability for your use of this information.

## 2019-10-02 NOTE — PROGRESS NOTES
This is an Initial Medicare Annual Wellness Exam (AWV) (Performed 12 months after IPPE or effective date of Medicare Part B enrollment, Once in a lifetime)    I have reviewed the patient's medical history in detail and updated the computerized patient record. History     Past Medical History:   Diagnosis Date    Hypercholesterolemia     Nausea & vomiting     Osteopenia       Past Surgical History:   Procedure Laterality Date    BREAST SURGERY PROCEDURE UNLISTED      Left breast biopsy (benign)    HX COLONOSCOPY      HX GYN      uterine cryo therapy    HX ORTHOPAEDIC      Right foot bunionectomy and neuroma removal    HX ORTHOPAEDIC Bilateral     ingrown toenails removed    HX ORTHOPAEDIC Left     removal foreign body (glass) left foot    HX OTHER SURGICAL Left 02/21/2018    glass removal from top of foot.  HX VERITO AND BSO      HX TUBAL LIGATION       Current Outpatient Medications   Medication Sig Dispense Refill    cholecalciferol, vitamin D3, (VITAMIN D3 PO) Take  by mouth.  ascorbic acid (VITAMIN C PO) Take  by mouth.  APPLE CIDER VINEGAR PO Take  by mouth.  varicella-zoster recombinant, PF, (SHINGRIX, PF,) 50 mcg/0.5 mL susr injection 0.5 mL by IntraMUSCular route once for 1 dose. 0.5 mL 0    pneumococcal 13 skye conj dip (PREVNAR 13, PF,) 0.5 mL syrg injection 0.5 mL by IntraMUSCular route once for 1 dose. 0.5 mL 0    glucosamine-chondroitin (ELATION) 1,500-1,200 mg/30 mL liqd Take 1 Tab by mouth daily.  vitamin E (AQUA GEMS) 400 unit capsule Take 400 Units by mouth daily.  MULTIVIT-MIN/IRON/FOLIC/LUTEIN (CENTRUM SILVER WOMEN PO) Take 1 Tab by mouth daily.  VITAMIN B COMPLEX (SUPER B-50 COMPLEX PO) Take 1 Tab by mouth daily.  ginkgo biloba 120 mg tab Take 1 Tab by mouth daily.  atorvastatin (LIPITOR) 20 mg tablet Take 1 Tab by mouth daily.  90 Tab 1     Allergies   Allergen Reactions    Benzonatate Other (comments)     headache    Codeine Other (comments)     Migraine per pt. Family History   Problem Relation Age of Onset    Cancer Father         lungs -- chemical exposure    Elevated Lipids Mother    [de-identified] Arthritis-osteo Mother     No Known Problems Sister      Social History     Tobacco Use    Smoking status: Never Smoker    Smokeless tobacco: Never Used   Substance Use Topics    Alcohol use: Yes     Alcohol/week: 2.0 standard drinks     Types: 2 Glasses of wine per week     Comment: 1-2 glass of wine per week     Patient Active Problem List   Diagnosis Code    Osteopenia M85.80    Hyperlipidemia E78.5       Depression Risk Factor Screening:     3 most recent PHQ Screens 10/2/2019   Little interest or pleasure in doing things Not at all   Feeling down, depressed, irritable, or hopeless Not at all   Total Score PHQ 2 0     Alcohol Risk Factor Screening: You do not drink alcohol or very rarely. Has 1-2 glasses of wine per week    Functional Ability and Level of Safety:     Hearing Loss  Hearing is good. Activities of Daily Living  The home contains: no safety equipment. Patient does total self care    Fall Risk  Fall Risk Assessment, last 12 mths 10/2/2019   Able to walk? Yes   Fall in past 12 months? No       Abuse Screen  Patient is not abused    Cognitive Screening   Evaluation of Cognitive Function:  Has your family/caregiver stated any concerns about your memory: no  Normal    Patient Care Team   Patient Care Team:  Lea Murphy MD as PCP - General (Internal Medicine)    Assessment/Plan   Education and counseling provided:  Are appropriate based on today's review and evaluation  End-of-Life planning (with patient's consent)  Pneumococcal Vaccine  Influenza Vaccine  Screening Mammography  Screening Pap and pelvic (covered once every 2 years)  Colorectal cancer screening tests  Bone mass measurement (DEXA)    Diagnoses and all orders for this visit:    1.  Encounter for immunization  -     INFLUENZA VACCINE INACTIVATED (IIV), SUBUNIT, ADJUVANTED, IM  -     ADMIN INFLUENZA VIRUS VAC  -     varicella-zoster recombinant, PF, (SHINGRIX, PF,) 50 mcg/0.5 mL susr injection; 0.5 mL by IntraMUSCular route once for 1 dose. -     pneumococcal 13 skye conj dip (PREVNAR 13, PF,) 0.5 mL syrg injection; 0.5 mL by IntraMUSCular route once for 1 dose. 2. Initial Medicare annual wellness visit    3. Screening for alcoholism  -     [place] ANNUAL ALCOHOL SCREEN 15 MIN    4. Screening for depression  -     DEPRESSION SCREEN ANNUAL    5. Screen for colon cancer  -     OCCULT BLOOD IMMUNOASSAY,DIAGNOSTIC    6. Need for hepatitis C screening test  -     HEPATITIS C AB         Health Maintenance Due   Topic Date Due    Hepatitis C Screening  1950    Shingrix Vaccine Age 50> (1 of 2) 04/17/2002    GLAUCOMA SCREENING Q2Y  04/17/2017    Pneumococcal 65+ years (1 of 2 - PCV13) 04/17/2017    MEDICARE YEARLY EXAM  03/10/2019    Influenza Age 9 to Adult  08/01/2019   HISTORY OF PRESENT ILLNESS  Bijan Templeton is a 81 y.o. female. HPI  Bijan Templeton is here for Initial Medical Annual wellness visit and screening. she does have other concerns. Reports she never started the Atorvastatin for her cholesterol but has made significant changes in her diet with cutting fat and cholesterol and has been much more active. She is fasting today to recheck labs. DEXA scan showed Osteopenia and she has been slightly Vitamin D deficient in the past; currently taking Vitamin D 1000 units daily and has calcium through diet. Health maintenance hx includes:  Exercise: very active.   Form of exercise: walks 10,000 steps daily and has been working out with  1-2 times per week with light weights and machines   Diet: generally follows a low fat low cholesterol diet  retired  Cancer screening:    Colon cancer screening:  Last Colonoscopy: 2011 and   was normal   Breast cancer screening: last mammogram 2018 and   was normal   Cervical cancer screening: last PAP/Pelvic exam: 2018   and was normal.   Osteoporosis screening:  Last BMD:  2018 and revealed - Osteopenia    Lab Results   Component Value Date/Time    Cholesterol, total 272 (H) 03/16/2018 09:45 AM    HDL Cholesterol 70 03/16/2018 09:45 AM    LDL, calculated 183 (H) 03/16/2018 09:45 AM    VLDL, calculated 19 03/16/2018 09:45 AM    Triglyceride 94 03/16/2018 09:45 AM         Lab Results   Component Value Date/Time    Glucose 96 03/16/2018 09:45 AM       Immunizations:     Immunization History   Administered Date(s) Administered    Tdap 05/31/2017    Zoster Vaccine, Live 01/01/2012      Immunization status: Influenza vaccine given today; printed Rx for Prevnar 13 and Shingrix given. Social History     Socioeconomic History    Marital status: SINGLE     Spouse name: Not on file    Number of children: Not on file    Years of education: Not on file    Highest education level: Not on file   Occupational History    Not on file   Social Needs    Financial resource strain: Not on file    Food insecurity:     Worry: Not on file     Inability: Not on file    Transportation needs:     Medical: Not on file     Non-medical: Not on file   Tobacco Use    Smoking status: Never Smoker    Smokeless tobacco: Never Used   Substance and Sexual Activity    Alcohol use:  Yes     Alcohol/week: 2.0 standard drinks     Types: 2 Glasses of wine per week     Comment: 1-2 glass of wine per week    Drug use: No    Sexual activity: Yes     Partners: Female   Lifestyle    Physical activity:     Days per week: Not on file     Minutes per session: Not on file    Stress: Not on file   Relationships    Social connections:     Talks on phone: Not on file     Gets together: Not on file     Attends Mosque service: Not on file     Active member of club or organization: Not on file     Attends meetings of clubs or organizations: Not on file     Relationship status: Not on file    Intimate partner violence:     Fear of current or ex partner: Not on file     Emotionally abused: Not on file     Physically abused: Not on file     Forced sexual activity: Not on file   Other Topics Concern    Not on file   Social History Narrative    Not on file     Past Surgical History:   Procedure Laterality Date    BREAST SURGERY PROCEDURE UNLISTED      Left breast biopsy (benign)    HX COLONOSCOPY      HX GYN      uterine cryo therapy    HX ORTHOPAEDIC      Right foot bunionectomy and neuroma removal    HX ORTHOPAEDIC Bilateral     ingrown toenails removed    HX ORTHOPAEDIC Left     removal foreign body (glass) left foot    HX OTHER SURGICAL Left 02/21/2018    glass removal from top of foot.  HX VERITO AND BSO      HX TUBAL LIGATION       Family History   Problem Relation Age of Onset    Cancer Father         lungs -- chemical exposure    Elevated Lipids Mother    [address] [facility] Arthritis-osteo Mother     No Known Problems Sister      Current Outpatient Medications on File Prior to Visit   Medication Sig Dispense Refill    cholecalciferol, vitamin D3, (VITAMIN D3 PO) Take  by mouth.  ascorbic acid (VITAMIN C PO) Take  by mouth.  APPLE CIDER VINEGAR PO Take  by mouth.  glucosamine-chondroitin (ELATION) 1,500-1,200 mg/30 mL liqd Take 1 Tab by mouth daily.  vitamin E (AQUA GEMS) 400 unit capsule Take 400 Units by mouth daily.  MULTIVIT-MIN/IRON/FOLIC/LUTEIN (CENTRUM SILVER WOMEN PO) Take 1 Tab by mouth daily.  VITAMIN B COMPLEX (SUPER B-50 COMPLEX PO) Take 1 Tab by mouth daily.  ginkgo biloba 120 mg tab Take 1 Tab by mouth daily.  atorvastatin (LIPITOR) 20 mg tablet Take 1 Tab by mouth daily. 90 Tab 1    [DISCONTINUED] carbamide peroxide (DEBROX) 6.5 % otic solution Administer 5 Drops into each ear two (2) times a day.  [DISCONTINUED] diphenhydrAMINE (BENADRYL) 25 mg capsule Take 25 mg by mouth nightly. No current facility-administered medications on file prior to visit.       .  Review of Systems Constitutional: Negative for chills, fever and malaise/fatigue. HENT: Negative for congestion, ear pain, hearing loss, sinus pain and sore throat. Eyes: Negative for blurred vision. Respiratory: Negative for cough and shortness of breath. Cardiovascular: Negative for chest pain, palpitations and leg swelling. Gastrointestinal: Negative for abdominal pain, blood in stool, constipation, diarrhea, nausea and vomiting. Genitourinary: Negative for dysuria and frequency. Musculoskeletal: Negative for joint pain and myalgias. Skin: Negative for rash. Neurological: Negative for dizziness, tingling and headaches. BP 92/66 (BP 1 Location: Left arm, BP Patient Position: Sitting)   Pulse 66   Temp 98.3 °F (36.8 °C) (Oral)   Resp 12   Ht 5' 4\" (1.626 m)   Wt 143 lb 3.2 oz (65 kg)   SpO2 98%   BMI 24.58 kg/m²   Physical Exam   Constitutional: She is oriented to person, place, and time. She appears well-developed and well-nourished. HENT:   Head: Normocephalic and atraumatic. Right Ear: External ear normal.   Left Ear: External ear normal.   Nose: Nose normal.   Mouth/Throat: Oropharynx is clear and moist.   Right ear canal occluded with cerumen   Eyes: Conjunctivae are normal.   Neck: Normal range of motion. Neck supple. No thyromegaly present. Cardiovascular: Normal rate, regular rhythm and normal heart sounds. Pulmonary/Chest: Effort normal and breath sounds normal. She has no wheezes. Abdominal: Soft. Bowel sounds are normal. There is no tenderness. There is no rebound and no guarding. Musculoskeletal: Normal range of motion. She exhibits no edema. Lymphadenopathy:     She has no cervical adenopathy. Neurological: She is alert and oriented to person, place, and time. Skin: Skin is warm and dry. No rash noted. Psychiatric: She has a normal mood and affect. Her behavior is normal.   Nursing note and vitals reviewed.       ASSESSMENT and PLAN  Diagnoses and all orders for this visit: 1. Encounter for immunization  -     INFLUENZA VACCINE INACTIVATED (IIV), SUBUNIT, ADJUVANTED, IM  -     ADMIN INFLUENZA VIRUS VAC  -     varicella-zoster recombinant, PF, (SHINGRIX, PF,) 50 mcg/0.5 mL susr injection; 0.5 mL by IntraMUSCular route once for 1 dose. -     pneumococcal 13 skye conj dip (PREVNAR 13, PF,) 0.5 mL syrg injection; 0.5 mL by IntraMUSCular route once for 1 dose. 2. Initial Medicare annual wellness visit    3. Screening for alcoholism  -     [place] ANNUAL ALCOHOL SCREEN 15 MIN    4. Screening for depression  -     DEPRESSION SCREEN ANNUAL    5. Screen for colon cancer  -     OCCULT BLOOD IMMUNOASSAY,DIAGNOSTIC    6. Need for hepatitis C screening test  -     HEPATITIS C AB    7. Vitamin D deficiency  -     VITAMIN D, 25 HYDROXY    8. Hyperlipidemia, unspecified hyperlipidemia type  -     METABOLIC PANEL, COMPREHENSIVE  -     LIPID PANEL    9. Impacted cerumen of right ear -- moderate amount of cerumen removed via irrigation; patient tolerated well  -     REMOVAL IMPACTED CERUMEN IRRIGATION/LVG DAYSI Templeton was counseled on age-appropriate/ guideline-based risk prevention behaviors and screening for a 81y.o. year old   female . We also discussed adjustments in screening based on family history if necessary. Printed instructions for preventative screening guidelines and healthy behaviors given to patient with after visit summary.         lab results and schedule of future lab studies reviewed with patient  reviewed diet, exercise and weight control  cardiovascular risk and specific lipid/LDL goals reviewed  reviewed medications and side effects in detail

## 2019-10-02 NOTE — ACP (ADVANCE CARE PLANNING)
Advance Care Planning (ACP) Provider Conversation Snapshot    Date of ACP Conversation: 10/02/19  Persons included in Conversation:  patient  Length of ACP Conversation in minutes:  <16 minutes (Non-Billable)    Authorized Decision Maker (if patient is incapable of making informed decisions): This person is:   Healthcare Agent/Medical Power of  under Advance Directive            For Patients with Decision Making Capacity:   Values/Goals: Exploration of values, goals, and preferences if recovery is not expected, even with continued medical treatment in the event of:  Imminent death  Severe, permanent brain injury  \"In these circumstances, what matters most to you? \"  Care focused more on comfort or quality of life.     Conversation Outcomes / Follow-Up Plan:   Recommended completion of Advance Directive form after review of ACP materials and conversation with prospective healthcare agent   Recommended communicating the plan and making copies for the healthcare agent, personal physician, and others as appropriate (e.g., health system)  Recommended review of completed ACP document annually or upon change in health status

## 2019-10-03 ENCOUNTER — HOSPITAL ENCOUNTER (OUTPATIENT)
Dept: LAB | Age: 69
Discharge: HOME | End: 2019-10-03
Payer: MEDICARE

## 2019-10-03 LAB
25(OH)D3+25(OH)D2 SERPL-MCNC: 55.9 NG/ML (ref 30–100)
ALBUMIN SERPL-MCNC: 4.5 G/DL (ref 3.6–4.8)
ALBUMIN/GLOB SERPL: 1.8 {RATIO} (ref 1.2–2.2)
ALP SERPL-CCNC: 47 IU/L (ref 39–117)
ALT SERPL-CCNC: 26 IU/L (ref 0–32)
AST SERPL-CCNC: 33 IU/L (ref 0–40)
BILIRUB SERPL-MCNC: 0.7 MG/DL (ref 0–1.2)
BUN SERPL-MCNC: 18 MG/DL (ref 8–27)
BUN/CREAT SERPL: 26 (ref 12–28)
CALCIUM SERPL-MCNC: 9.8 MG/DL (ref 8.7–10.3)
CHLORIDE SERPL-SCNC: 99 MMOL/L (ref 96–106)
CHOLEST SERPL-MCNC: 225 MG/DL (ref 100–199)
CO2 SERPL-SCNC: 25 MMOL/L (ref 20–29)
CREAT SERPL-MCNC: 0.69 MG/DL (ref 0.57–1)
GLOBULIN SER CALC-MCNC: 2.5 G/DL (ref 1.5–4.5)
GLUCOSE SERPL-MCNC: 77 MG/DL (ref 65–99)
HCV AB S/CO SERPL IA: <0.1 S/CO RATIO (ref 0–0.9)
HDLC SERPL-MCNC: 76 MG/DL
INTERPRETATION, 910389: NORMAL
LDLC SERPL CALC-MCNC: 136 MG/DL (ref 0–99)
POTASSIUM SERPL-SCNC: 4.4 MMOL/L (ref 3.5–5.2)
PROT SERPL-MCNC: 7 G/DL (ref 6–8.5)
SODIUM SERPL-SCNC: 142 MMOL/L (ref 134–144)
TRIGL SERPL-MCNC: 65 MG/DL (ref 0–149)
VLDLC SERPL CALC-MCNC: 13 MG/DL (ref 5–40)

## 2019-10-03 PROCEDURE — 82270 OCCULT BLOOD FECES: CPT

## 2019-10-09 LAB — HEMOCCULT STL QL IA: NEGATIVE

## 2020-03-13 ENCOUNTER — HOSPITAL ENCOUNTER (OUTPATIENT)
Dept: GENERAL RADIOLOGY | Age: 70
Discharge: HOME | End: 2020-03-13
Attending: NURSE PRACTITIONER
Payer: MEDICARE

## 2020-03-13 ENCOUNTER — OFFICE VISIT (OUTPATIENT)
Dept: INTERNAL MEDICINE CLINIC | Age: 70
End: 2020-03-13

## 2020-03-13 VITALS
BODY MASS INDEX: 25.68 KG/M2 | WEIGHT: 150.4 LBS | HEART RATE: 65 BPM | SYSTOLIC BLOOD PRESSURE: 107 MMHG | TEMPERATURE: 97.4 F | HEIGHT: 64 IN | RESPIRATION RATE: 14 BRPM | DIASTOLIC BLOOD PRESSURE: 72 MMHG | OXYGEN SATURATION: 95 %

## 2020-03-13 DIAGNOSIS — H25.9 AGE-RELATED CATARACT OF BOTH EYES, UNSPECIFIED AGE-RELATED CATARACT TYPE: Primary | ICD-10-CM

## 2020-03-13 DIAGNOSIS — M85.80 OSTEOPENIA, UNSPECIFIED LOCATION: ICD-10-CM

## 2020-03-13 DIAGNOSIS — Z01.818 PREOP GENERAL PHYSICAL EXAM: ICD-10-CM

## 2020-03-13 DIAGNOSIS — M25.552 LEFT HIP PAIN: ICD-10-CM

## 2020-03-13 PROCEDURE — 73502 X-RAY EXAM HIP UNI 2-3 VIEWS: CPT

## 2020-03-13 RX ORDER — DICLOFENAC SODIUM 75 MG/1
75 TABLET, DELAYED RELEASE ORAL 2 TIMES DAILY
Qty: 40 TAB | Refills: 0 | Status: SHIPPED | OUTPATIENT
Start: 2020-03-13 | End: 2020-09-10

## 2020-03-13 NOTE — PATIENT INSTRUCTIONS
Cataract Surgery: Before Your Surgery  What is cataract surgery? Cataracts are cloudy areas in the lens of your eye. Your lens is behind the colored part of your eye (iris). Its job is to focus light onto the back of your eye. In some people, cataracts prevent light from reaching the back of the eye. This can cause vision problems. Cataract surgery helps you see better. It replaces your natural lens, which has become cloudy, with a clear artificial one. There are two types of cataract surgery. Phacoemulsification (say \"dfpu-xb-ry-elu-nao-did-COCO-shun\") is the most common type. The doctor makes a small cut in your eye. This cut is called an incision. The doctor uses a special ultrasound tool to break your cloudy lens apart. Sometimes a laser is used too. Then he or she removes the small pieces of the lens through the incision. In most cases, the doctor then inserts an artificial lens through the incision. Most people do not need stitches, because the incision is so small. If the doctor is not able to put in an artificial lens, you can wear a contact lens or thick glasses in place of your natural lens. Extracapsular extraction is a less common type of cataract surgery. The doctor makes a larger incision to remove the whole lens at once. After the doctor removes the lens, he or she stitches up the incision. Recovery from this type of surgery takes longer. Before either surgery, the doctor puts numbing drops in your eye. Some doctors use a shot instead. You may also get medicine to make you feel relaxed. You probably will not feel much pain. The surgery takes about 20 to 40 minutes. After surgery, you may have a bandage or shield on your eye. You will probably go home from surgery after 1 hour in the recovery room. Most people see better in 1 to 3 days. You may be able to go back to work or your normal routine in a few days.  It could take 3 to 10 weeks for your eye to completely heal. After your eye heals, you may still need to wear glasses, especially for reading. Follow-up care is a key part of your treatment and safety. Be sure to make and go to all appointments, and call your doctor if you are having problems. It's also a good idea to know your test results and keep a list of the medicines you take. What happens before surgery?   Surgery can be stressful. This information will help you understand what you can expect. And it will help you safely prepare for surgery.   Preparing for surgery    · Understand exactly what surgery is planned, along with the risks, benefits, and other options. · Tell your doctors ALL the medicines, vitamins, supplements, and herbal remedies you take. Some of these can increase the risk of bleeding or interact with anesthesia.     · If you take aspirin or some other blood thinner, ask your doctor if you should stop taking it before your surgery. Make sure that you understand exactly what your doctor wants you to do. These medicines increase the risk of bleeding.     · Your doctor will tell you which medicines to take or stop before your surgery. You may need to stop taking certain medicines a week or more before surgery. So talk to your doctor as soon as you can.     · If you have an advance directive, let your doctor know. It may include a living will and a durable power of  for health care. Bring a copy to the hospital. If you don't have one, you may want to prepare one. It lets your doctor and loved ones know your health care wishes. Doctors advise that everyone prepare these papers before any type of surgery or procedure. What happens on the day of surgery? · Follow the instructions exactly about when to stop eating and drinking. If you don't, your surgery may be canceled. If your doctor told you to take your medicines on the day of surgery, take them with only a sip of water.     · Take a bath or shower before you come in for your surgery.  Do not apply lotions, perfumes, deodorants, or nail polish.     · Take off all jewelry and piercings. And take out contact lenses, if you wear them.    At the hospital or surgery center   · Bring a picture ID.     · The area for surgery is often marked to make sure there are no errors.     · You will be kept comfortable and safe by your anesthesia provider. The anesthesia may make you sleep. Or it may just numb the area being worked on.     · The surgery will take about 20 to 40 minutes. Going home   · Be sure you have someone to drive you home. Anesthesia and pain medicine make it unsafe for you to drive.     · You will be given more specific instructions about recovering from your surgery. They will cover things like diet, wound care, follow-up care, driving, and getting back to your normal routine.     · You may have a bandage or patch over your eye. You may also have a clear shield over your eye. This prevents you from rubbing it. When should you call your doctor? · You have questions or concerns.     · You don't understand how to prepare for your surgery.     · You become ill before the surgery (such as fever, flu, or a cold).     · You need to reschedule or have changed your mind about having the surgery. Where can you learn more? Go to http://keegan-kristal.info/  Enter K474 in the search box to learn more about \"Cataract Surgery: Before Your Surgery. \"  Current as of: December 17, 2019Content Version: 12.4  © 1498-5120 [employer]. Care instructions adapted under license by [employer] (which disclaims liability or warranty for this information). If you have questions about a medical condition or this instruction, always ask your healthcare professional. [place] [address] any warranty or liability for your use of this information. Hip Bursitis: Care Instructions  Your Care Instructions    Bursitis is inflammation of the bursa.  A bursa is a small sac of fluid that cushions a joint and helps it move easily. A bursa sits between a bone in the hip and the muscles and tendons in the thigh and buttock. Injury or overuse of the hip can cause bursitis. Activities that can lead to bursitis include twisting and rapid joint movement. Bursitis can cause hip pain. Bursitis usually gets better if you avoid the activity that caused it. If pain lasts or gets worse despite home treatment, your doctor may draw fluid from the bursa through a needle. This may relieve your pain and help your doctor know if you have an infection. If so, your doctor will prescribe antibiotics. If you have inflammation only, you may get a corticosteroid shot to reduce swelling and pain. Sometimes surgery is needed to drain or remove the bursa. Follow-up care is a key part of your treatment and safety. Be sure to make and go to all appointments, and call your doctor if you are having problems. It's also a good idea to know your test results and keep a list of the medicines you take. How can you care for yourself at home? · Put ice or a cold pack on your hip for 10 to 20 minutes at a time. Put a thin cloth between the ice and your skin. · After 3 days of using ice, you may use heat on your hip. You can use a hot water bottle, a heating pad set on low, or a warm, moist towel. · Rest your hip. Stop any activities that cause pain. Switch to activities that do not stress your hip. · Take your medicines exactly as prescribed. Call your doctor if you think you are having a problem with your medicine. · Ask your doctor if you can take an over-the-counter pain medicine, such as acetaminophen (Tylenol), ibuprofen (Advil, Motrin), or naproxen (Aleve). Be safe with medicines. Read and follow all instructions on the label. · To prevent stiffness, gently move the hip joint as much as you can without pain every day. As the pain gets better, keep doing range-of-motion exercises.  Ask your doctor for exercises that will make the muscles around the hip joint stronger. Do these as directed. · You can slowly return to the activity that caused the pain, but do it with less effort until you can do it without pain or swelling. Be sure to warm up before and stretch after you do the activity. When should you call for help? Call your doctor now or seek immediate medical care if:    · You have a fever.     · You have increased swelling or redness in your hip.     · You cannot use your hip, or the pain in your hip gets worse.    Watch closely for changes in your health, and be sure to contact your doctor if:    · You have pain for 2 weeks or longer despite home treatment. Where can you learn more? Go to http://keegan-kristal.info/  Enter I156 in the search box to learn more about \"Hip Bursitis: Care Instructions. \"  Current as of: June 26, 2019Content Version: 12.4  © 3168-0961 [employer]. Care instructions adapted under license by [employer] (which disclaims liability or warranty for this information). If you have questions about a medical condition or this instruction, always ask your healthcare professional. [place] [address] any warranty or liability for your use of this information. Hip Bursitis: Exercises  Introduction  Here are some examples of exercises for you to try. The exercises may be suggested for a condition or for rehabilitation. Start each exercise slowly. Ease off the exercises if you start to have pain. You will be told when to start these exercises and which ones will work best for you. How to do the exercises  Hip rotator stretch   1. Lie on your back with both knees bent and your feet flat on the floor. 2. Put the ankle of your affected leg on your opposite thigh near your knee. 3. Use your hand to gently push your knee away from your body until you feel a gentle stretch around your hip.   4. Hold the stretch for 15 to 30 seconds. 5. Repeat 2 to 4 times. 6. Repeat steps 1 through 5, but this time use your hand to gently pull your knee toward your opposite shoulder. Iliotibial band stretch   1. Lean sideways against a wall. If you are not steady on your feet, hold on to a chair or counter. 2. Stand on the leg with the affected hip, with that leg close to the wall. Then cross your other leg in front of it. 3. Let your affected hip drop out to the side of your body and against wall. Then lean away from your affected hip until you feel a stretch. 4. Hold the stretch for 15 to 30 seconds. 5. Repeat 2 to 4 times. Straight-leg raises to the outside   1. Lie on your side, with your affected hip on top. 2. Tighten the front thigh muscles of your top leg to keep your knee straight. 3. Keep your hip and your leg straight in line with the rest of your body, and keep your knee pointing forward. Do not drop your hip back. 4. Lift your top leg straight up toward the ceiling, about 12 inches off the floor. Hold for about 6 seconds, then slowly lower your leg. 5. Repeat 8 to 12 times. Clamshell   1. Lie on your side, with your affected hip on top and your head propped on a pillow. Keep your feet and knees together and your knees bent. 2. Raise your top knee, but keep your feet together. Do not let your hips roll back. Your legs should open up like a clamshell. 3. Hold for 6 seconds. 4. Slowly lower your knee back down. Rest for 10 seconds. 5. Repeat 8 to 12 times. Follow-up care is a key part of your treatment and safety. Be sure to make and go to all appointments, and call your doctor if you are having problems. It's also a good idea to know your test results and keep a list of the medicines you take. Where can you learn more? Go to http://keegan-kristal.info/  Enter H674 in the search box to learn more about \"Hip Bursitis: Exercises. \"  Current as of: June 26, 2019Content Version: 12.4  © 3865-0916 [employer]. Care instructions adapted under license by [employer] (which disclaims liability or warranty for this information). If you have questions about a medical condition or this instruction, always ask your healthcare professional. [address] any warranty or liability for your use of this information.

## 2020-03-13 NOTE — PROGRESS NOTES
HISTORY OF PRESENT ILLNESS  Tonie Mcardle deWiel is a 81 y.o. female. HPI  Tonie Mcardle deWiel was referred for evaluation by: Dr. Glendy Louie for Pre- Op Evaluation. Please see encounter details and orders for consultative summary. Type of surgery : Cataract extraction with implantation of intraocular lens  Surgery site : Right eye 3/23/2020; Left eye 3/30/2020  Anesthesia type: Peribulbar, MAC  Date of procedure:  As above    Allergies: Allergies   Allergen Reactions    Benzonatate Other (comments)     headache    Codeine Other (comments)     Migraine per pt. Latex allergy: no  Prior reactions to anesthesia:  Has had nausea and vomiting with General anesthesia in past; has tolerated other forms of sedation without issues. Functional status:  she is able to ambulate up 2 flights of step without shortness of breath, chest pain  Prior cardiac evaluation:   None    Started a new job at [employer] in January and is commuting 2 hours each way. She has noted aching pain in left hip that worsens at night when rolling over onto left hip. Has been taking Glucosamine-chondroitin but has not taken any other OTC medications for current symptoms. Denies weakness in left leg. She has history of Osteopenia on DEXA scan. Walking 8500 steps daily. Current Outpatient Medications   Medication Sig    diclofenac EC (VOLTAREN) 75 mg EC tablet Take 1 Tab by mouth two (2) times a day. X 1 week then twice daily as needed    cholecalciferol, vitamin D3, (VITAMIN D3 PO) Take  by mouth.  ascorbic acid (VITAMIN C PO) Take  by mouth.  APPLE CIDER VINEGAR PO Take  by mouth.  glucosamine-chondroitin (ELATION) 1,500-1,200 mg/30 mL liqd Take 1 Tab by mouth daily.  vitamin E (AQUA GEMS) 400 unit capsule Take 400 Units by mouth daily.  MULTIVIT-MIN/IRON/FOLIC/LUTEIN (CENTRUM SILVER WOMEN PO) Take 1 Tab by mouth daily.  VITAMIN B COMPLEX (SUPER B-50 COMPLEX PO) Take 1 Tab by mouth daily.     ginkgo biloba 120 mg tab Take 1 Tab by mouth daily. No current facility-administered medications for this visit. Past Medical History:   Diagnosis Date    Hypercholesterolemia     Nausea & vomiting     Osteopenia      Past Surgical History:   Procedure Laterality Date    BREAST SURGERY PROCEDURE UNLISTED      Left breast biopsy (benign)    HX COLONOSCOPY      HX GYN      uterine cryo therapy    HX ORTHOPAEDIC      Right foot bunionectomy and neuroma removal    HX ORTHOPAEDIC Bilateral     ingrown toenails removed    HX ORTHOPAEDIC Left     removal foreign body (glass) left foot    HX OTHER SURGICAL Left 02/21/2018    glass removal from top of foot.  HX VERITO AND BSO      HX TUBAL LIGATION       Social History     Tobacco Use    Smoking status: Never Smoker    Smokeless tobacco: Never Used   Substance Use Topics    Alcohol use: Yes     Alcohol/week: 2.0 standard drinks     Types: 2 Glasses of wine per week     Comment: 1-2 glass of wine per week    Drug use: No       Blood pressure 107/72, pulse 65, temperature 97.4 °F (36.3 °C), temperature source Oral, resp. rate 14, height 5' 4\" (1.626 m), weight 150 lb 6.4 oz (68.2 kg), SpO2 95 %. Review of Systems   Constitutional: Negative for chills, fever and malaise/fatigue. HENT: Negative for congestion and sore throat. Respiratory: Negative for cough and shortness of breath. Cardiovascular: Negative for chest pain and palpitations. Gastrointestinal: Negative for nausea and vomiting. Genitourinary: Negative for dysuria and frequency. Musculoskeletal: Positive for joint pain (left hip pain). Skin: Negative for rash. Neurological: Negative for dizziness, tingling and headaches. Psychiatric/Behavioral: Negative for depression. The patient is not nervous/anxious. Physical Exam  Vitals signs and nursing note reviewed. Constitutional:       Appearance: Normal appearance. HENT:      Head: Normocephalic and atraumatic.       Right Ear: Tympanic membrane, ear canal and external ear normal.      Left Ear: Tympanic membrane, ear canal and external ear normal.      Nose: Nose normal.      Mouth/Throat:      Mouth: Mucous membranes are moist.      Pharynx: Oropharynx is clear. Eyes:      Extraocular Movements: Extraocular movements intact. Conjunctiva/sclera: Conjunctivae normal.      Pupils: Pupils are equal, round, and reactive to light. Neck:      Musculoskeletal: Normal range of motion and neck supple. Cardiovascular:      Rate and Rhythm: Normal rate and regular rhythm. Pulses: Normal pulses. Heart sounds: Normal heart sounds. Pulmonary:      Effort: Pulmonary effort is normal.      Breath sounds: Normal breath sounds. No wheezing or rhonchi. Abdominal:      General: Abdomen is flat. Bowel sounds are normal.      Palpations: Abdomen is soft. Tenderness: There is no abdominal tenderness. Musculoskeletal:         General: Tenderness present. Left hip: She exhibits tenderness. She exhibits normal range of motion and normal strength. Legs:       Comments: Tenderness over trochanteric bursa left hip   Skin:     General: Skin is warm and dry. Neurological:      General: No focal deficit present. Mental Status: She is alert and oriented to person, place, and time. Psychiatric:         Mood and Affect: Mood normal.         Behavior: Behavior normal.         ASSESSMENT and PLAN  Diagnoses and all orders for this visit:    1. Age-related cataract of both eyes, unspecified age-related cataract type    2. Preop general physical exam -- considered medically stable for upcoming Cataract extraction surgery. 3. Left hip pain -- suspect bursitis; prescription for Diclofenac given with instructions to start several days after her 2nd cataract surgery. -     XR HIP LT W OR WO PELV 2-3 VWS; Future  -     diclofenac EC (VOLTAREN) 75 mg EC tablet; Take 1 Tab by mouth two (2) times a day. X 1 week then twice daily as needed    4. Osteopenia, unspecified location  -     XR HIP LT W OR WO PELV 2-3 VWS; Future      lab results and schedule of future lab studies reviewed with patient  reviewed diet, exercise and weight control  reviewed medications and side effects in detail

## 2020-09-10 ENCOUNTER — OFFICE VISIT (OUTPATIENT)
Dept: INTERNAL MEDICINE CLINIC | Age: 70
End: 2020-09-10
Payer: MEDICARE

## 2020-09-10 VITALS
RESPIRATION RATE: 16 BRPM | BODY MASS INDEX: 26.13 KG/M2 | WEIGHT: 152.2 LBS | HEART RATE: 69 BPM | DIASTOLIC BLOOD PRESSURE: 76 MMHG | TEMPERATURE: 97.5 F | OXYGEN SATURATION: 96 % | SYSTOLIC BLOOD PRESSURE: 108 MMHG

## 2020-09-10 DIAGNOSIS — Z23 NEEDS FLU SHOT: ICD-10-CM

## 2020-09-10 DIAGNOSIS — Z00.00 MEDICARE ANNUAL WELLNESS VISIT, SUBSEQUENT: Primary | ICD-10-CM

## 2020-09-10 LAB
ALBUMIN SERPL-MCNC: 3.9 G/DL (ref 3.5–5)
ALBUMIN/GLOB SERPL: 1.1 {RATIO} (ref 1.1–2.2)
ALP SERPL-CCNC: 57 U/L (ref 45–117)
ALT SERPL-CCNC: 30 U/L (ref 12–78)
ANION GAP SERPL CALC-SCNC: 7 MMOL/L (ref 5–15)
AST SERPL-CCNC: 24 U/L (ref 15–37)
BILIRUB SERPL-MCNC: 0.4 MG/DL (ref 0.2–1)
BUN SERPL-MCNC: 13 MG/DL (ref 6–20)
BUN/CREAT SERPL: 16 (ref 12–20)
CALCIUM SERPL-MCNC: 9.7 MG/DL (ref 8.5–10.1)
CHLORIDE SERPL-SCNC: 104 MMOL/L (ref 97–108)
CHOLEST SERPL-MCNC: 240 MG/DL
CO2 SERPL-SCNC: 29 MMOL/L (ref 21–32)
CREAT SERPL-MCNC: 0.8 MG/DL (ref 0.55–1.02)
ERYTHROCYTE [DISTWIDTH] IN BLOOD BY AUTOMATED COUNT: 12.6 % (ref 11.5–14.5)
GLOBULIN SER CALC-MCNC: 3.5 G/DL (ref 2–4)
GLUCOSE SERPL-MCNC: 89 MG/DL (ref 65–100)
HCT VFR BLD AUTO: 43 % (ref 35–47)
HDLC SERPL-MCNC: 62 MG/DL
HDLC SERPL: 3.9 {RATIO} (ref 0–5)
HGB BLD-MCNC: 13.7 G/DL (ref 11.5–16)
LDLC SERPL CALC-MCNC: 137.2 MG/DL (ref 0–100)
LIPID PROFILE,FLP: ABNORMAL
MCH RBC QN AUTO: 30.4 PG (ref 26–34)
MCHC RBC AUTO-ENTMCNC: 31.9 G/DL (ref 30–36.5)
MCV RBC AUTO: 95.3 FL (ref 80–99)
NRBC # BLD: 0 K/UL (ref 0–0.01)
NRBC BLD-RTO: 0 PER 100 WBC
PLATELET # BLD AUTO: 181 K/UL (ref 150–400)
PMV BLD AUTO: 12.3 FL (ref 8.9–12.9)
POTASSIUM SERPL-SCNC: 4.8 MMOL/L (ref 3.5–5.1)
PROT SERPL-MCNC: 7.4 G/DL (ref 6.4–8.2)
RBC # BLD AUTO: 4.51 M/UL (ref 3.8–5.2)
SODIUM SERPL-SCNC: 140 MMOL/L (ref 136–145)
TRIGL SERPL-MCNC: 204 MG/DL (ref ?–150)
TSH SERPL DL<=0.05 MIU/L-ACNC: 1.71 UIU/ML (ref 0.36–3.74)
VLDLC SERPL CALC-MCNC: 40.8 MG/DL
WBC # BLD AUTO: 5.7 K/UL (ref 3.6–11)

## 2020-09-10 PROCEDURE — G8510 SCR DEP NEG, NO PLAN REQD: HCPCS

## 2020-09-10 PROCEDURE — 3017F COLORECTAL CA SCREEN DOC REV: CPT

## 2020-09-10 PROCEDURE — G9899 SCRN MAM PERF RSLTS DOC: HCPCS

## 2020-09-10 PROCEDURE — G8419 CALC BMI OUT NRM PARAM NOF/U: HCPCS

## 2020-09-10 PROCEDURE — G8536 NO DOC ELDER MAL SCRN: HCPCS

## 2020-09-10 PROCEDURE — G8427 DOCREV CUR MEDS BY ELIG CLIN: HCPCS

## 2020-09-10 PROCEDURE — G8399 PT W/DXA RESULTS DOCUMENT: HCPCS

## 2020-09-10 PROCEDURE — 1101F PT FALLS ASSESS-DOCD LE1/YR: CPT

## 2020-09-10 PROCEDURE — G0008 ADMIN INFLUENZA VIRUS VAC: HCPCS

## 2020-09-10 PROCEDURE — 90694 VACC AIIV4 NO PRSRV 0.5ML IM: CPT

## 2020-09-10 PROCEDURE — G0439 PPPS, SUBSEQ VISIT: HCPCS

## 2020-09-10 NOTE — PROGRESS NOTES
This is the Subsequent Medicare Annual Wellness Exam, performed 12 months or more after the Initial AWV or the last Subsequent AWV    I have reviewed the patient's medical history in detail and updated the computerized patient record. Her grandkids are in virtual school 10and 5year old and kids are studying in a pod  Trying to hit 10,000 steps a day -   Has been feeling good   She was doing well but covid did hit and she did gain some weight     History     Patient Active Problem List   Diagnosis Code    Osteopenia M85.80    Hyperlipidemia E78.5     Past Medical History:   Diagnosis Date    Hypercholesterolemia     Nausea & vomiting     Osteopenia       Past Surgical History:   Procedure Laterality Date    BREAST SURGERY PROCEDURE UNLISTED      Left breast biopsy (benign)    HX COLONOSCOPY      HX GYN      uterine cryo therapy    HX ORTHOPAEDIC      Right foot bunionectomy and neuroma removal    HX ORTHOPAEDIC Bilateral     ingrown toenails removed    HX ORTHOPAEDIC Left     removal foreign body (glass) left foot    HX OTHER SURGICAL Left 02/21/2018    glass removal from top of foot.  HX VERITO AND BSO      HX TUBAL LIGATION       Current Outpatient Medications   Medication Sig Dispense Refill    cholecalciferol, vitamin D3, (VITAMIN D3 PO) Take  by mouth.  ascorbic acid (VITAMIN C PO) Take  by mouth.  APPLE CIDER VINEGAR PO Take  by mouth.  glucosamine-chondroitin (ELATION) 1,500-1,200 mg/30 mL liqd Take 1 Tab by mouth daily.  vitamin E (AQUA GEMS) 400 unit capsule Take 400 Units by mouth daily.  MULTIVIT-MIN/IRON/FOLIC/LUTEIN (CENTRUM SILVER WOMEN PO) Take 1 Tab by mouth daily.  VITAMIN B COMPLEX (SUPER B-50 COMPLEX PO) Take 1 Tab by mouth daily.  ginkgo biloba 120 mg tab Take 1 Tab by mouth daily. Allergies   Allergen Reactions    Benzonatate Other (comments)     headache    Codeine Other (comments)     Migraine per pt.        Family History   Problem Relation Age of Onset    Cancer Father         lungs -- chemical exposure    Elevated Lipids Mother    [facility] Arthritis-osteo Mother     No Known Problems Sister     No Known Problems Daughter     No Known Problems Son      Social History     Tobacco Use    Smoking status: Never Smoker    Smokeless tobacco: Never Used   Substance Use Topics    Alcohol use: Yes     Alcohol/week: 2.0 standard drinks     Types: 2 Glasses of wine per week     Comment: 1-2 glass of wine per week       Depression Risk Factor Screening:     3 most recent PHQ Screens 9/10/2020   Little interest or pleasure in doing things Not at all   Feeling down, depressed, irritable, or hopeless Not at all   Total Score PHQ 2 0       Alcohol Risk Screen   Do you average more than 1 drink per night or more than 7 drinks a week:  No    On any one occasion in the past three months have you have had more than 3 drinks containing alcohol:  No        Functional Ability and Level of Safety:   Hearing: Hearing is good. Activities of Daily Living: The home contains: no safety equipment. Patient does total self care     Ambulation: with no difficulty     Fall Risk:  Fall Risk Assessment, last 12 mths 10/2/2019   Able to walk? Yes   Fall in past 12 months? No     Abuse Screen:  Patient is not abused       Cognitive Screening   Has your family/caregiver stated any concerns about your memory: no     Cognitive Screening: Normal - MMSE (Mini Mental Status Exam)    Patient Care Team   Patient Care Team:  Yohana Molina MD as PCP - General (Internal Medicine)  Yohana Molina MD as PCP - [facility] Empaneled Provider    Assessment/Plan   Education and counseling provided:  Are appropriate based on today's review and evaluation    1. Needs flu shot    - FLU (FLUAD QUAD INFLUENZA VACCINE,QUAD,ADJUVANTED)    2. Medicare annual wellness visit, subsequent    - CBC W/O DIFF; Future  - LIPID PANEL; Future  - METABOLIC PANEL, COMPREHENSIVE;  Future  - TSH 3RD GENERATION;  Future    Health Maintenance Due   Topic Date Due    Shingrix Vaccine Age 49> (1 of 2) 04/17/2002    Pneumococcal 65+ years (1 of 1 - PPSV23) 04/17/2017    Flu Vaccine (1) 09/01/2020    Medicare Yearly Exam  10/02/2020

## 2020-09-10 NOTE — PATIENT INSTRUCTIONS
Medicare Wellness Visit, Female     The best way to live healthy is to have a lifestyle where you eat a well-balanced diet, exercise regularly, limit alcohol use, and quit all forms of tobacco/nicotine, if applicable. Regular preventive services are another way to keep healthy. Preventive services (vaccines, screening tests, monitoring & exams) can help personalize your care plan, which helps you manage your own care. Screening tests can find health problems at the earliest stages, when they are easiest to treat. [facility] follows the current, evidence-based guidelines published by the [facility] Jimenez ([facility]) when recommending preventive services for our patients. Because we follow these guidelines, sometimes recommendations change over time as research supports it. (For example, mammograms used to be recommended annually. Even though Medicare will still pay for an annual mammogram, the newer guidelines recommend a mammogram every two years for women of average risk). Of course, you and your doctor may decide to screen more often for some diseases, based on your risk and your co-morbidities (chronic disease you are already diagnosed with). Preventive services for you include:  - Medicare offers their members a free annual wellness visit, which is time for you and your primary care provider to discuss and plan for your preventive service needs. Take advantage of this benefit every year!  -All adults over the age of 72 should receive the recommended pneumonia vaccines. Current USPSTF guidelines recommend a series of two vaccines for the best pneumonia protection.   -All adults should have a flu vaccine yearly and a tetanus vaccine every 10 years.   -All adults age 48 and older should receive the shingles vaccines (series of two vaccines).       -All adults age 38-68 who are overweight should have a diabetes screening test once every three years.   -All adults born between 80 and 1965 should be screened once for Hepatitis C.  -Other screening tests and preventive services for persons with diabetes include: an eye exam to screen for diabetic retinopathy, a kidney function test, a foot exam, and stricter control over your cholesterol.   -Cardiovascular screening for adults with routine risk involves an electrocardiogram (ECG) at intervals determined by your doctor.   -Colorectal cancer screenings should be done for adults age 54-65 with no increased risk factors for colorectal cancer. There are a number of acceptable methods of screening for this type of cancer. Each test has its own benefits and drawbacks. Discuss with your doctor what is most appropriate for you during your annual wellness visit. The different tests include: colonoscopy (considered the best screening method), a fecal occult blood test, a fecal DNA test, and sigmoidoscopy.    -A bone mass density test is recommended when a woman turns 65 to screen for osteoporosis. This test is only recommended one time, as a screening. Some providers will use this same test as a disease monitoring tool if you already have osteoporosis. -Breast cancer screenings are recommended every other year for women of normal risk, age 54-69.  -Cervical cancer screenings for women over age 72 are only recommended with certain risk factors.      Here is a list of your current Health Maintenance items (your personalized list of preventive services) with a due date:  Health Maintenance Due   Topic Date Due    Shingles Vaccine (1 of 2) 04/17/2002    Pneumococcal Vaccine (1 of 1 - PPSV23) 04/17/2017    Yearly Flu Vaccine (1) 09/01/2020    Annual Well Visit  10/02/2020

## 2021-02-02 ENCOUNTER — PATIENT MESSAGE (OUTPATIENT)
Dept: INTERNAL MEDICINE CLINIC | Age: 71
End: 2021-02-02

## 2021-08-13 ENCOUNTER — TELEPHONE (OUTPATIENT)
Dept: INTERNAL MEDICINE CLINIC | Age: 71
End: 2021-08-13

## 2021-08-13 NOTE — TELEPHONE ENCOUNTER
I did call patient to offer an appointment today with Сергей White NP, however she was already at [facility]. Advised to let us know if she needs anything further. She advised she would call if needed.

## 2021-08-13 NOTE — TELEPHONE ENCOUNTER
----- Message from Kim Castellanos sent at 8/13/2021  9:14 AM EDT -----  Regarding: MD Obinna/Telephone  Appointment not available    Caller's first and last name and relationship to patient (if not the patient): Self      Best contact number: [phone]      Preferred date and time: Today      Scheduled appointment date and time: NA      Reason for appointment: Back pain due to fall. Details to clarify the request: Pt was in [place] Tuesday when she fell on her back. Is back home today and still in pain. No openings until 08/17, advised [facility]. Pt understood.       Kim Castellanos

## 2021-08-30 ENCOUNTER — OFFICE VISIT (OUTPATIENT)
Dept: INTERNAL MEDICINE CLINIC | Age: 71
End: 2021-08-30
Payer: MEDICARE

## 2021-08-30 VITALS
HEART RATE: 78 BPM | DIASTOLIC BLOOD PRESSURE: 67 MMHG | RESPIRATION RATE: 16 BRPM | HEIGHT: 64 IN | BODY MASS INDEX: 23.6 KG/M2 | OXYGEN SATURATION: 96 % | WEIGHT: 138.2 LBS | TEMPERATURE: 98.5 F | SYSTOLIC BLOOD PRESSURE: 96 MMHG

## 2021-08-30 DIAGNOSIS — Z78.0 POST-MENOPAUSAL: ICD-10-CM

## 2021-08-30 DIAGNOSIS — E78.9 LIPID DISORDER: ICD-10-CM

## 2021-08-30 DIAGNOSIS — M54.50 ACUTE BILATERAL LOW BACK PAIN WITHOUT SCIATICA: Primary | ICD-10-CM

## 2021-08-30 PROCEDURE — G8420 CALC BMI NORM PARAMETERS: HCPCS | Performed by: INTERNAL MEDICINE

## 2021-08-30 PROCEDURE — 3017F COLORECTAL CA SCREEN DOC REV: CPT | Performed by: INTERNAL MEDICINE

## 2021-08-30 PROCEDURE — G9899 SCRN MAM PERF RSLTS DOC: HCPCS | Performed by: INTERNAL MEDICINE

## 2021-08-30 PROCEDURE — G8536 NO DOC ELDER MAL SCRN: HCPCS | Performed by: INTERNAL MEDICINE

## 2021-08-30 PROCEDURE — G0463 HOSPITAL OUTPT CLINIC VISIT: HCPCS | Performed by: INTERNAL MEDICINE

## 2021-08-30 PROCEDURE — G8427 DOCREV CUR MEDS BY ELIG CLIN: HCPCS | Performed by: INTERNAL MEDICINE

## 2021-08-30 PROCEDURE — 1090F PRES/ABSN URINE INCON ASSESS: CPT | Performed by: INTERNAL MEDICINE

## 2021-08-30 PROCEDURE — G8399 PT W/DXA RESULTS DOCUMENT: HCPCS | Performed by: INTERNAL MEDICINE

## 2021-08-30 PROCEDURE — 99214 OFFICE O/P EST MOD 30 MIN: CPT | Performed by: INTERNAL MEDICINE

## 2021-08-30 PROCEDURE — G8510 SCR DEP NEG, NO PLAN REQD: HCPCS | Performed by: INTERNAL MEDICINE

## 2021-08-30 PROCEDURE — 1101F PT FALLS ASSESS-DOCD LE1/YR: CPT | Performed by: INTERNAL MEDICINE

## 2021-08-30 NOTE — PROGRESS NOTES
Elzbieta Hartman (: 1950) is a 73 y.o. female, established patient, here for evaluation of the following chief complaint(s):  Follow-up (labs, recently had a fall, fell on back. has xray disk)       ASSESSMENT/PLAN:  Below is the assessment and plan developed based on review of pertinent history, physical exam, labs, studies, and medications. 1. Acute bilateral low back pain without sciatica  -     CBC W/O DIFF; Future  -     SED RATE (ESR); Future  -     RA + CCP ABS; Future  Continue with chiropractor. I will examine inflammatory and autoimmune markers due to reported arthralgias and the pt's request.   2. Post-menopausal  -     DEXA BONE DENSITY STUDY AXIAL; Future  I will order a DEXA scan to determine the status of the pt's osteopenia. 3. Lipid disorder  -     METABOLIC PANEL, COMPREHENSIVE; Future  -     LIPID PANEL; Future  Presumed stable, will recheck labs today. I do not recommend any change in treatment plan, continue with healthy diet and exercise. No follow-ups on file. SUBJECTIVE/OBJECTIVE:  HPI    Hx of Fall: Pt states that she fell onto her lower back/gluteus while playing racket ball. She underwent x-rays and was informed that nothing is broken, just out of alignment. Pt is seeing a chiropractor in [place] who told her that her osteopenia was significant. She presents today requesting blood work to explain her aches and pains, as well as undergo a DEXA to examine her osteopenia. Other: Pt reports monitoring her diet and walking 6x/week. Review of Systems   Musculoskeletal: Positive for arthralgias and back pain. All other systems reviewed and are negative. Physical Exam  Constitutional:       Appearance: Normal appearance. HENT:      Right Ear: Tympanic membrane and external ear normal.      Left Ear: Tympanic membrane and external ear normal.      Mouth/Throat:      Mouth: Mucous membranes are moist.      Pharynx: Oropharynx is clear.    Cardiovascular:      Rate and Rhythm: Normal rate and regular rhythm. Pulses: Normal pulses. Heart sounds: Normal heart sounds. Pulmonary:      Effort: Pulmonary effort is normal.      Breath sounds: Normal breath sounds. Musculoskeletal:         General: Normal range of motion. Lumbar back: Tenderness present. Skin:     General: Skin is warm and dry. Neurological:      General: No focal deficit present. Mental Status: She is alert and oriented to person, place, and time. Psychiatric:         Mood and Affect: Mood normal.         Behavior: Behavior normal.     On this date 08/30/2021 I have spent 30 minutes reviewing previous notes, test results and face to face with the patient discussing the diagnosis and importance of compliance with the treatment plan as well as documenting on the day of the visit. An electronic signature was used to authenticate this note. Written by Ike Duncan as dictated by Dr. Maldonado Vee.    -- Ike Duncan

## 2021-09-03 LAB
ALBUMIN SERPL-MCNC: 4.5 G/DL (ref 3.8–4.8)
ALBUMIN/GLOB SERPL: 1.7 {RATIO} (ref 1.2–2.2)
ALP SERPL-CCNC: 89 IU/L (ref 48–121)
ALT SERPL-CCNC: 26 IU/L (ref 0–32)
AST SERPL-CCNC: 25 IU/L (ref 0–40)
BILIRUB SERPL-MCNC: 0.6 MG/DL (ref 0–1.2)
BUN SERPL-MCNC: 16 MG/DL (ref 8–27)
BUN/CREAT SERPL: 22 (ref 12–28)
CALCIUM SERPL-MCNC: 10 MG/DL (ref 8.7–10.3)
CCP IGA+IGG SERPL IA-ACNC: 9 UNITS (ref 0–19)
CHLORIDE SERPL-SCNC: 103 MMOL/L (ref 96–106)
CHOLEST SERPL-MCNC: 245 MG/DL (ref 100–199)
CO2 SERPL-SCNC: 28 MMOL/L (ref 20–29)
CREAT SERPL-MCNC: 0.74 MG/DL (ref 0.57–1)
ERYTHROCYTE [DISTWIDTH] IN BLOOD BY AUTOMATED COUNT: 12 % (ref 11.7–15.4)
ERYTHROCYTE [SEDIMENTATION RATE] IN BLOOD BY WESTERGREN METHOD: 6 MM/HR (ref 0–40)
GLOBULIN SER CALC-MCNC: 2.7 G/DL (ref 1.5–4.5)
GLUCOSE SERPL-MCNC: 98 MG/DL (ref 65–99)
HCT VFR BLD AUTO: 42.5 % (ref 34–46.6)
HDLC SERPL-MCNC: 70 MG/DL
HGB BLD-MCNC: 14.2 G/DL (ref 11.1–15.9)
IMP & REVIEW OF LAB RESULTS: NORMAL
LDLC SERPL CALC-MCNC: 157 MG/DL (ref 0–99)
MCH RBC QN AUTO: 30.8 PG (ref 26.6–33)
MCHC RBC AUTO-ENTMCNC: 33.4 G/DL (ref 31.5–35.7)
MCV RBC AUTO: 92 FL (ref 79–97)
PLATELET # BLD AUTO: 216 X10E3/UL (ref 150–450)
POTASSIUM SERPL-SCNC: 5.1 MMOL/L (ref 3.5–5.2)
PROT SERPL-MCNC: 7.2 G/DL (ref 6–8.5)
RBC # BLD AUTO: 4.61 X10E6/UL (ref 3.77–5.28)
RHEUMATOID FACT SERPL-ACNC: 10.4 IU/ML (ref 0–13.9)
SODIUM SERPL-SCNC: 142 MMOL/L (ref 134–144)
TRIGL SERPL-MCNC: 102 MG/DL (ref 0–149)
VLDLC SERPL CALC-MCNC: 18 MG/DL (ref 5–40)
WBC # BLD AUTO: 5.3 X10E3/UL (ref 3.4–10.8)

## 2021-09-09 ENCOUNTER — HOSPITAL ENCOUNTER (OUTPATIENT)
Dept: MAMMOGRAPHY | Age: 71
Discharge: HOME | End: 2021-09-09
Attending: INTERNAL MEDICINE
Payer: MEDICARE

## 2021-09-09 DIAGNOSIS — Z78.0 POST-MENOPAUSAL: ICD-10-CM

## 2021-09-09 PROCEDURE — 77080 DXA BONE DENSITY AXIAL: CPT

## 2021-10-18 ENCOUNTER — OFFICE VISIT (OUTPATIENT)
Dept: INTERNAL MEDICINE CLINIC | Age: 71
End: 2021-10-18
Payer: MEDICARE

## 2021-10-18 VITALS
DIASTOLIC BLOOD PRESSURE: 66 MMHG | SYSTOLIC BLOOD PRESSURE: 98 MMHG | OXYGEN SATURATION: 98 % | RESPIRATION RATE: 16 BRPM | HEIGHT: 64 IN | HEART RATE: 70 BPM | BODY MASS INDEX: 23.42 KG/M2 | TEMPERATURE: 97.4 F | WEIGHT: 137.2 LBS

## 2021-10-18 DIAGNOSIS — Z82.49 FH: HEART DISEASE: ICD-10-CM

## 2021-10-18 DIAGNOSIS — Z00.00 MEDICARE ANNUAL WELLNESS VISIT, SUBSEQUENT: Primary | ICD-10-CM

## 2021-10-18 DIAGNOSIS — M81.0 OSTEOPOROSIS, UNSPECIFIED OSTEOPOROSIS TYPE, UNSPECIFIED PATHOLOGICAL FRACTURE PRESENCE: ICD-10-CM

## 2021-10-18 DIAGNOSIS — E78.5 DYSLIPIDEMIA: ICD-10-CM

## 2021-10-18 DIAGNOSIS — Z23 NEEDS FLU SHOT: ICD-10-CM

## 2021-10-18 PROCEDURE — G8432 DEP SCR NOT DOC, RNG: HCPCS | Performed by: INTERNAL MEDICINE

## 2021-10-18 PROCEDURE — 1090F PRES/ABSN URINE INCON ASSESS: CPT | Performed by: INTERNAL MEDICINE

## 2021-10-18 PROCEDURE — G8536 NO DOC ELDER MAL SCRN: HCPCS | Performed by: INTERNAL MEDICINE

## 2021-10-18 PROCEDURE — G8427 DOCREV CUR MEDS BY ELIG CLIN: HCPCS | Performed by: INTERNAL MEDICINE

## 2021-10-18 PROCEDURE — G0439 PPPS, SUBSEQ VISIT: HCPCS | Performed by: INTERNAL MEDICINE

## 2021-10-18 PROCEDURE — G0008 ADMIN INFLUENZA VIRUS VAC: HCPCS | Performed by: INTERNAL MEDICINE

## 2021-10-18 PROCEDURE — G8420 CALC BMI NORM PARAMETERS: HCPCS | Performed by: INTERNAL MEDICINE

## 2021-10-18 PROCEDURE — 1101F PT FALLS ASSESS-DOCD LE1/YR: CPT | Performed by: INTERNAL MEDICINE

## 2021-10-18 PROCEDURE — G0463 HOSPITAL OUTPT CLINIC VISIT: HCPCS | Performed by: INTERNAL MEDICINE

## 2021-10-18 PROCEDURE — 99214 OFFICE O/P EST MOD 30 MIN: CPT | Performed by: INTERNAL MEDICINE

## 2021-10-18 PROCEDURE — G9899 SCRN MAM PERF RSLTS DOC: HCPCS | Performed by: INTERNAL MEDICINE

## 2021-10-18 PROCEDURE — 3017F COLORECTAL CA SCREEN DOC REV: CPT | Performed by: INTERNAL MEDICINE

## 2021-10-18 RX ORDER — CALCIUM CARBONATE/VITAMIN D3 600 MG-125
1 TABLET ORAL DAILY
COMMUNITY

## 2021-10-18 NOTE — PROGRESS NOTES
Vika Hartman (: 1950) is a 73 y.o. female, established patient, here for evaluation of the following chief complaint(s):  Results (Bone scan)       ASSESSMENT/PLAN:  Below is the assessment and plan developed based on review of pertinent history, physical exam, labs, studies, and medications. 1. Osteoporosis, unspecified osteoporosis type, unspecified pathological fracture presence  I previously contacted the pt about OTC remedies, exercises, and medications that can strengthen her bones. I suggested that she take 1,200 mg of Calcium and 1,000 iu vitamin D. I tried to ease her concerns about her cholesterol increasing due to OTC remedies. I expressed the importance of bone health as she ages and how it can effect her overall health if she falls. I recommended that she take tylenol for 48 hours following her injection to manage side effects. 2. Dyslipidemia  -     CT HEART W/O CONT WITH CALCIUM; Future  I recommended that the pt undergo a CT heart scan and educated her on the implications of the potential results. 3. FH: heart disease  -     CT HEART W/O CONT WITH CALCIUM; Future  I recommended that the pt undergo a CT heart scan and educated her on the implications of the potential results. 4. Needs flu shot  -     FLU (FLUAD QUAD INFLUENZA VACCINE,QUAD,ADJUVANTED)  Flu shot to be administered in office today. No follow-ups on file. SUBJECTIVE/OBJECTIVE:  HPI    Osteoporosis: Pt states that the pt is interested in receiving Reclast infusions for convenience and to minimize side effects. She notes that she has signed up with a  to maximize core strength and balance. Pt walks for exercise 5-6 days per week. She reports that she is taking 600 mg of calcium with Vitamin D, as well as supplemental Vitamin D. Pt expresses concern that the supplements will increase her cholesterol. Hyperlipidemia:  Cardiovascular risk analysis - 73 y.o. female LDL goal is under 130.    ROS: taking medications as instructed, no medication side effects noted, no TIA's, no chest pain on exertion, no dyspnea on exertion, no swelling of ankles. Tolerating meds, no myalgias or other side effects noted. New concerns: Pt's last LDL was 157 on 9/1/21. She expresses concern about her cholesterol and plaque build up. Other: Pt denies frequent alcohol consumption or abnormal hearing loss. Review of Systems   All other systems reviewed and are negative. Physical Exam  Constitutional:       Appearance: Normal appearance. HENT:      Right Ear: Tympanic membrane and external ear normal.      Left Ear: Tympanic membrane and external ear normal.      Mouth/Throat:      Mouth: Mucous membranes are moist.      Pharynx: Oropharynx is clear. Cardiovascular:      Rate and Rhythm: Normal rate and regular rhythm. Pulses: Normal pulses. Heart sounds: Normal heart sounds. Pulmonary:      Effort: Pulmonary effort is normal.      Breath sounds: Normal breath sounds. Musculoskeletal:         General: Normal range of motion. Skin:     General: Skin is warm and dry. Neurological:      General: No focal deficit present. Mental Status: She is alert and oriented to person, place, and time. Psychiatric:         Mood and Affect: Mood normal.         Behavior: Behavior normal.     On this date 10/18/2021 I have spent 30 minutes reviewing previous notes, test results and face to face with the patient discussing the diagnosis and importance of compliance with the treatment plan as well as documenting on the day of the visit. An electronic signature was used to authenticate this note. Written by Major Michael as dictated by Dr. Gil Rosales.    -- Major Michael

## 2021-10-18 NOTE — PATIENT INSTRUCTIONS
Medicare Wellness Visit, Female     The best way to live healthy is to have a lifestyle where you eat a well-balanced diet, exercise regularly, limit alcohol use, and quit all forms of tobacco/nicotine, if applicable. Regular preventive services are another way to keep healthy. Preventive services (vaccines, screening tests, monitoring & exams) can help personalize your care plan, which helps you manage your own care. Screening tests can find health problems at the earliest stages, when they are easiest to treat. [facility] follows the current, evidence-based guidelines published by the [facility] Jim[facility] ([facility]) when recommending preventive services for our patients. Because we follow these guidelines, sometimes recommendations change over time as research supports it. (For example, mammograms used to be recommended annually. Even though Medicare will still pay for an annual mammogram, the newer guidelines recommend a mammogram every two years for women of average risk). Of course, you and your doctor may decide to screen more often for some diseases, based on your risk and your co-morbidities (chronic disease you are already diagnosed with). Preventive services for you include:  - Medicare offers their members a free annual wellness visit, which is time for you and your primary care provider to discuss and plan for your preventive service needs. Take advantage of this benefit every year!  -All adults over the age of 72 should receive the recommended pneumonia vaccines. Current USPSTF guidelines recommend a series of two vaccines for the best pneumonia protection.   -All adults should have a flu vaccine yearly and a tetanus vaccine every 10 years.   -All adults age 48 and older should receive the shingles vaccines (series of two vaccines).       -All adults age 38-68 who are overweight should have a diabetes screening test once every three years.   -All adults born between 80 and 1965 should be screened once for Hepatitis C.  -Other screening tests and preventive services for persons with diabetes include: an eye exam to screen for diabetic retinopathy, a kidney function test, a foot exam, and stricter control over your cholesterol.   -Cardiovascular screening for adults with routine risk involves an electrocardiogram (ECG) at intervals determined by your doctor.   -Colorectal cancer screenings should be done for adults age 54-65 with no increased risk factors for colorectal cancer. There are a number of acceptable methods of screening for this type of cancer. Each test has its own benefits and drawbacks. Discuss with your doctor what is most appropriate for you during your annual wellness visit. The different tests include: colonoscopy (considered the best screening method), a fecal occult blood test, a fecal DNA test, and sigmoidoscopy.    -A bone mass density test is recommended when a woman turns 65 to screen for osteoporosis. This test is only recommended one time, as a screening. Some providers will use this same test as a disease monitoring tool if you already have osteoporosis. -Breast cancer screenings are recommended every other year for women of normal risk, age 54-69.  -Cervical cancer screenings for women over age 72 are only recommended with certain risk factors.      Here is a list of your current Health Maintenance items (your personalized list of preventive services) with a due date:  Health Maintenance Due   Topic Date Due    Shingles Vaccine (2 of 2) 12/10/2020    Colorectal Screening  01/01/2021    Yearly Flu Vaccine (1) 09/01/2021    Annual Well Visit  09/11/2021

## 2021-10-18 NOTE — PROGRESS NOTES
This is the Subsequent Medicare Annual Wellness Exam, performed 12 months or more after the Initial AWV or the last Subsequent AWV    I have reviewed the patient's medical history in detail and updated the computerized patient record. Assessment/Plan   Education and counseling provided:  Are appropriate based on today's review and evaluation    1. Osteoporosis, unspecified osteoporosis type, unspecified pathological fracture presence  2. Dyslipidemia  -     CT HEART W/O CONT WITH CALCIUM; Future  3. FH: heart disease  -     CT HEART W/O CONT WITH CALCIUM; Future  4. Needs flu shot  -     FLU (FLUAD QUAD INFLUENZA VACCINE,QUAD,ADJUVANTED)       Depression Risk Factor Screening     3 most recent PHQ Screens 8/30/2021   Little interest or pleasure in doing things Not at all   Feeling down, depressed, irritable, or hopeless Not at all   Total Score PHQ 2 0       Alcohol Risk Screen    Do you average more than 1 drink per night or more than 7 drinks a week:  No    On any one occasion in the past three months have you have had more than 3 drinks containing alcohol:  No        Functional Ability and Level of Safety    Hearing: Hearing is good. Activities of Daily Living: The home contains: no safety equipment. Patient does total self care      Ambulation: with no difficulty     Fall Risk:  Fall Risk Assessment, last 12 mths 8/30/2021   Able to walk? Yes   Fall in past 12 months? 1   Do you feel unsteady? 0   Are you worried about falling 0   Number of falls in past 12 months 1   Fall with injury?  1      Abuse Screen:  Patient is not abused       Cognitive Screening    Has your family/caregiver stated any concerns about your memory: no     Cognitive Screening: Normal - MMSE (Mini Mental Status Exam)    Health Maintenance Due     Health Maintenance Due   Topic Date Due    Shingrix Vaccine Age 49> (2 of 2) 12/10/2020    Colorectal Cancer Screening Combo  01/01/2021    Flu Vaccine (1) 09/01/2021    Medicare Yearly Exam  09/11/2021       Patient Care Team   Patient Care Team:  Leo Bland MD as PCP - General (Internal Medicine)  Leo Bland MD as PCP [facility]    History     Patient Active Problem List   Diagnosis Code    Osteopenia M85.80    Hyperlipidemia E78.5     Past Medical History:   Diagnosis Date    Hypercholesterolemia     Nausea & vomiting     Osteopenia       Past Surgical History:   Procedure Laterality Date    HX BLADDER SUSPENSION  12/2020    HX COLONOSCOPY      HX GYN      uterine cryo therapy    HX ORTHOPAEDIC      Right foot bunionectomy and neuroma removal    HX ORTHOPAEDIC Bilateral     ingrown toenails removed    HX ORTHOPAEDIC Left     removal foreign body (glass) left foot    HX OTHER SURGICAL Left 02/21/2018    glass removal from top of foot.  HX VERITO AND BSO      HX TUBAL LIGATION      OH BREAST SURGERY PROCEDURE UNLISTED      Left breast biopsy (benign)     Current Outpatient Medications   Medication Sig Dispense Refill    vitamin B complex (SUPER B COMPLEX-B-12 PO) Take  by mouth daily.  calcium-cholecalciferol, d3, (CALCIUM 600 + D) 600-125 mg-unit tab Take  by mouth daily.  OTHER Take 1 Tablet by mouth daily. prevagen      elderberry fruit (ELDERBERRY PO) Take 1 Tablet by mouth daily.  cholecalciferol, vitamin D3, (VITAMIN D3 PO) Take  by mouth.  ascorbic acid (VITAMIN C PO) Take  by mouth.  glucosamine-chondroitin (ELATION) 1,500-1,200 mg/30 mL liqd Take 1 Tab by mouth daily.  MULTIVIT-MIN/IRON/FOLIC/LUTEIN (CENTRUM SILVER WOMEN PO) Take 1 Tab by mouth daily. Allergies   Allergen Reactions    Benzonatate Other (comments)     headache    Codeine Other (comments)     Migraine per pt.        Family History   Problem Relation Age of Onset    Cancer Father         lungs -- chemical exposure    Elevated Lipids Mother    Dossie Destiny Arthritis-osteo Mother     No Known Problems Sister     No Known Problems Daughter  No Known Problems Son      Social History     Tobacco Use    Smoking status: Never Smoker    Smokeless tobacco: Never Used   Substance Use Topics    Alcohol use:  Yes     Alcohol/week: 2.0 standard drinks     Types: 2 Glasses of wine per week     Comment: 1-2 glass of wine per week         Vee Mo MD

## 2021-10-19 RX ORDER — SODIUM CHLORIDE 9 MG/ML
25 INJECTION, SOLUTION INTRAVENOUS CONTINUOUS
Status: DISPENSED | OUTPATIENT
Start: 2021-10-25 | End: 2021-10-25

## 2021-10-19 RX ORDER — ZOLEDRONIC ACID 5 MG/100ML
5 INJECTION, SOLUTION INTRAVENOUS ONCE
Status: DISCONTINUED | OUTPATIENT
Start: 2021-10-25 | End: 2021-10-25

## 2021-10-25 ENCOUNTER — TELEPHONE (OUTPATIENT)
Dept: INTERNAL MEDICINE CLINIC | Age: 71
End: 2021-10-25

## 2021-10-25 ENCOUNTER — HOSPITAL ENCOUNTER (OUTPATIENT)
Dept: INFUSION THERAPY | Age: 71
Discharge: HOME | End: 2021-10-25

## 2021-10-25 NOTE — TELEPHONE ENCOUNTER
Patient was calling to say she was here Monday, but on Friday she tested positive for COVID but wanted us to know.

## 2021-10-28 DIAGNOSIS — M81.0 OSTEOPOROSIS, UNSPECIFIED OSTEOPOROSIS TYPE, UNSPECIFIED PATHOLOGICAL FRACTURE PRESENCE: Primary | ICD-10-CM

## 2021-10-28 RX ORDER — ONDANSETRON 2 MG/ML
8 INJECTION INTRAMUSCULAR; INTRAVENOUS AS NEEDED
Status: CANCELLED | OUTPATIENT
Start: 2021-11-05

## 2021-10-28 RX ORDER — DIPHENHYDRAMINE HYDROCHLORIDE 50 MG/ML
25 INJECTION, SOLUTION INTRAMUSCULAR; INTRAVENOUS AS NEEDED
Status: CANCELLED
Start: 2021-11-05

## 2021-10-28 RX ORDER — ALBUTEROL SULFATE 0.83 MG/ML
2.5 SOLUTION RESPIRATORY (INHALATION) AS NEEDED
Status: CANCELLED
Start: 2021-11-05

## 2021-10-28 RX ORDER — DIPHENHYDRAMINE HYDROCHLORIDE 50 MG/ML
50 INJECTION, SOLUTION INTRAMUSCULAR; INTRAVENOUS AS NEEDED
Status: CANCELLED
Start: 2021-11-05

## 2021-10-28 RX ORDER — ACETAMINOPHEN 325 MG/1
650 TABLET ORAL AS NEEDED
Status: CANCELLED
Start: 2021-11-05

## 2021-10-28 RX ORDER — SODIUM CHLORIDE 9 MG/ML
25 INJECTION, SOLUTION INTRAVENOUS CONTINUOUS
Status: CANCELLED | OUTPATIENT
Start: 2021-11-05

## 2021-10-28 RX ORDER — EPINEPHRINE 1 MG/ML
0.3 INJECTION, SOLUTION, CONCENTRATE INTRAVENOUS AS NEEDED
Status: CANCELLED | OUTPATIENT
Start: 2021-11-05

## 2021-10-28 RX ORDER — HYDROCORTISONE SODIUM SUCCINATE 100 MG/2ML
100 INJECTION, POWDER, FOR SOLUTION INTRAMUSCULAR; INTRAVENOUS AS NEEDED
Status: CANCELLED | OUTPATIENT
Start: 2021-11-05

## 2021-10-28 RX ORDER — SODIUM CHLORIDE 0.9 % (FLUSH) 0.9 %
10 SYRINGE (ML) INJECTION AS NEEDED
Status: CANCELLED | OUTPATIENT
Start: 2021-11-05

## 2021-10-28 RX ORDER — ZOLEDRONIC ACID 5 MG/100ML
5 INJECTION, SOLUTION INTRAVENOUS ONCE
Status: CANCELLED | OUTPATIENT
Start: 2021-11-05 | End: 2021-11-05

## 2021-10-28 RX ORDER — HEPARIN 100 UNIT/ML
300-500 SYRINGE INTRAVENOUS AS NEEDED
Status: CANCELLED
Start: 2021-11-05

## 2021-10-28 RX ORDER — SODIUM CHLORIDE 9 MG/ML
10 INJECTION INTRAMUSCULAR; INTRAVENOUS; SUBCUTANEOUS AS NEEDED
Status: CANCELLED | OUTPATIENT
Start: 2021-11-05

## 2021-11-04 ENCOUNTER — PATIENT MESSAGE (OUTPATIENT)
Dept: INTERNAL MEDICINE CLINIC | Age: 71
End: 2021-11-04

## 2021-11-04 ENCOUNTER — HOSPITAL ENCOUNTER (OUTPATIENT)
Dept: CT IMAGING | Age: 71
Discharge: HOME | End: 2021-11-04
Attending: INTERNAL MEDICINE
Payer: SELF-PAY

## 2021-11-04 DIAGNOSIS — Z82.49 FH: HEART DISEASE: ICD-10-CM

## 2021-11-04 DIAGNOSIS — E78.5 DYSLIPIDEMIA: ICD-10-CM

## 2021-11-04 PROCEDURE — 75571 CT HRT W/O DYE W/CA TEST: CPT

## 2021-11-05 ENCOUNTER — HOSPITAL ENCOUNTER (OUTPATIENT)
Dept: INFUSION THERAPY | Age: 71
End: 2021-11-05

## 2021-11-05 ENCOUNTER — VIRTUAL VISIT (OUTPATIENT)
Dept: INTERNAL MEDICINE CLINIC | Age: 71
End: 2021-11-05
Payer: MEDICARE

## 2021-11-05 ENCOUNTER — TELEPHONE (OUTPATIENT)
Dept: INTERNAL MEDICINE CLINIC | Age: 71
End: 2021-11-05

## 2021-11-05 DIAGNOSIS — U07.1 PNEUMONIA DUE TO COVID-19 VIRUS: Primary | ICD-10-CM

## 2021-11-05 DIAGNOSIS — J12.82 PNEUMONIA DUE TO COVID-19 VIRUS: Primary | ICD-10-CM

## 2021-11-05 PROCEDURE — 1090F PRES/ABSN URINE INCON ASSESS: CPT | Performed by: INTERNAL MEDICINE

## 2021-11-05 PROCEDURE — G8432 DEP SCR NOT DOC, RNG: HCPCS | Performed by: INTERNAL MEDICINE

## 2021-11-05 PROCEDURE — G8536 NO DOC ELDER MAL SCRN: HCPCS | Performed by: INTERNAL MEDICINE

## 2021-11-05 PROCEDURE — G8427 DOCREV CUR MEDS BY ELIG CLIN: HCPCS | Performed by: INTERNAL MEDICINE

## 2021-11-05 PROCEDURE — 1101F PT FALLS ASSESS-DOCD LE1/YR: CPT | Performed by: INTERNAL MEDICINE

## 2021-11-05 PROCEDURE — G8420 CALC BMI NORM PARAMETERS: HCPCS | Performed by: INTERNAL MEDICINE

## 2021-11-05 PROCEDURE — 99214 OFFICE O/P EST MOD 30 MIN: CPT | Performed by: INTERNAL MEDICINE

## 2021-11-05 PROCEDURE — G9899 SCRN MAM PERF RSLTS DOC: HCPCS | Performed by: INTERNAL MEDICINE

## 2021-11-05 PROCEDURE — G0463 HOSPITAL OUTPT CLINIC VISIT: HCPCS | Performed by: INTERNAL MEDICINE

## 2021-11-05 PROCEDURE — 3017F COLORECTAL CA SCREEN DOC REV: CPT | Performed by: INTERNAL MEDICINE

## 2021-11-05 RX ORDER — AZITHROMYCIN 250 MG/1
250 TABLET, FILM COATED ORAL SEE ADMIN INSTRUCTIONS
Qty: 6 TABLET | Refills: 0 | Status: SHIPPED | OUTPATIENT
Start: 2021-11-05 | End: 2021-11-10

## 2021-11-05 NOTE — TELEPHONE ENCOUNTER
----- Message from Walter Solomon Michaeljani sent at 11/4/2021  9:25 PM EDT -----  Regarding: ct heart scan  I believe I told the person I spoke with, that I did had symptoms. I also mentioned that I was in your office that Monday and had the flu shot and initially though the symptoms were from the shot. When I took the  COVID test it definitely showed positive. I also mentioned that I postponed my CT scan and infusion treatment for 2 weeks. Currently I am still having some shortness of breath and I  have a cough. The daytime fever finally seems to be gone but I am still having the night sweats. The only other issue am Im still dealing with is the headaches off and on. I will postpone the infusion treatment tomorrow.   Thank you

## 2021-11-05 NOTE — TELEPHONE ENCOUNTER
Can you set her up for a virtual visit? Or call and check on her today.  Apparently she called last week and only message we got was an [place]- nothing about her symptoms

## 2021-11-05 NOTE — PROGRESS NOTES
Olivier Hartman (: 1950) is a 73 y.o. female, established patient, here for evaluation of the following chief complaint(s):   Follow-up (covid)       ASSESSMENT/PLAN:  Below is the assessment and plan developed based on review of pertinent history, labs, studies, and medications. 1. Pneumonia due to COVID-19 virus  I am pleased that the pt feels like she is improving. Since she feels mucus in the back of her throat, I am concerned that she may have a secondary infection. I recommended that she take Delsom if she experiences a cough, but to treat sx with Vicks vapor rub if she can. I suggested that she return to the gym when she feels ready, get her booster around February, and receive her infusion once she has recovered. I recommended that she contact me prior to receiving the booster to discuss whether the data at that time deems a booster necessary for her. Will continue to monitor for improvements or changes. No follow-ups on file. SUBJECTIVE/OBJECTIVE:  HPI     COVID-19: Pt is currently fighting COVID-19; her sx presented as 10/22/21 as a fever. Her most recent CT heart  scan was indicative of COVID pneumonia. She states that she is experiencing fluctuating SOB, which is worsened by activity or talking and leads to coughing. Pt does not feel as though she is declining, but more so oscillating between breathing poorly and breathing well. She reports relief from her albuterol inhaler, which she used more frequently earlier in the week. Pt took Mucinex and Delsom earlier in the week as well, but is unsure of whether she should continue with any of these regimens. She manages her HA before bed with ibuprofen. Pt endorses improvement in her energy and activity level. She experienced congestion relief from [facility] use. Pt notes that she does feel thick mucus in her throat. She states that she is drinking normally, but has to force herself to eat due to her loss of taste.      Review of Systems HENT: Positive for congestion and postnasal drip. Respiratory: Positive for shortness of breath. All other systems reviewed and are negative. Patient-Reported Pulse: 79  Patient-Reported Pulse Oximetry: 98%       Physical Exam    [INSTRUCTIONS:  \"[x]\" Indicates a positive item  \"[]\" Indicates a negative item  -- DELETE ALL ITEMS NOT EXAMINED]    Constitutional: [x] Appears well-developed and well-nourished [x] No apparent distress      [] Abnormal -     Mental status: [x] Alert and awake  [x] Oriented to person/place/time [x] Able to follow commands    [] Abnormal -     Eyes:   EOM    [x]  Normal    [] Abnormal -   Sclera  [x]  Normal    [] Abnormal -          Discharge [x]  None visible   [] Abnormal -     HENT: [x] Normocephalic, atraumatic  [] Abnormal -   [x] Mouth/Throat: Mucous membranes are moist    External Ears [x] Normal  [] Abnormal -    Neck: [x] No visualized mass [] Abnormal -     Pulmonary/Chest: [x] Respiratory effort normal   [x] No visualized signs of difficulty breathing or respiratory distress        [] Abnormal -      Musculoskeletal:   [x] Normal gait with no signs of ataxia         [x] Normal range of motion of neck        [] Abnormal -     Neurological:        [x] No Facial Asymmetry (Cranial nerve 7 motor function) (limited exam due to video visit)          [x] No gaze palsy        [] Abnormal -          Skin:        [x] No significant exanthematous lesions or discoloration noted on facial skin         [] Abnormal -            Psychiatric:       [x] Normal Affect [] Abnormal -        [x] No Hallucinations    Other pertinent observable physical exam findings:-        On this date 11/05/2021 I have spent 30 minutes reviewing previous notes, test results and face to face (virtual) with the patient discussing the diagnosis and importance of compliance with the treatment plan as well as documenting on the day of the visit.     Lupe Hartman was evaluated through a synchronous (real-time) audio-video encounter. The patient (or guardian if applicable) is aware that this is a billable service. Verbal consent to proceed has been obtained within the past 12 months. The visit was conducted pursuant to the emergency declaration under the [facility], [address] [place] [address] authority and the [employer] Act. Patient identification was verified, and a caregiver was present when appropriate. The patient was located in a state where the provider was credentialed to provide care. An electronic signature was used to authenticate this note. Written by Funmi Coleman as dictated by Dr. Rosalba Ahumada.    -- Funmi Coleman

## 2022-03-19 PROBLEM — M81.0 OSTEOPOROSIS: Status: ACTIVE | Noted: 2021-10-28

## 2022-03-19 PROBLEM — M85.80 OSTEOPENIA: Status: ACTIVE | Noted: 2018-03-09

## 2022-03-19 PROBLEM — E78.5 HYPERLIPIDEMIA: Status: ACTIVE | Noted: 2018-03-09

## 2022-04-05 ENCOUNTER — TELEPHONE (OUTPATIENT)
Dept: FAMILY MEDICINE CLINIC | Age: 72
End: 2022-04-05

## 2022-04-05 NOTE — TELEPHONE ENCOUNTER
----- Message from Melissa Diego sent at 4/5/2022 11:23 AM EDT -----  Subject: Message to Provider    QUESTIONS  Information for Provider? pt would like to be put on a cancelation list   for new pt appts. hers in on 5/24/2022 but would like to be seen sooner. ---------------------------------------------------------------------------  --------------  Lujean Lanes INFO  What is the best way for the office to contact you? OK to leave message on   voicemail  Preferred Call Back Phone Number? [phone]  ---------------------------------------------------------------------------  --------------  SCRIPT ANSWERS  Relationship to Patient?  Self

## 2022-05-24 ENCOUNTER — OFFICE VISIT (OUTPATIENT)
Dept: FAMILY MEDICINE CLINIC | Age: 72
End: 2022-05-24
Payer: MEDICARE

## 2022-05-24 VITALS
BODY MASS INDEX: 22.88 KG/M2 | HEART RATE: 60 BPM | SYSTOLIC BLOOD PRESSURE: 118 MMHG | DIASTOLIC BLOOD PRESSURE: 62 MMHG | WEIGHT: 134 LBS | OXYGEN SATURATION: 100 % | HEIGHT: 64 IN | TEMPERATURE: 97.1 F

## 2022-05-24 DIAGNOSIS — M81.0 AGE-RELATED OSTEOPOROSIS WITHOUT CURRENT PATHOLOGICAL FRACTURE: ICD-10-CM

## 2022-05-24 DIAGNOSIS — E78.2 MIXED HYPERLIPIDEMIA: Primary | ICD-10-CM

## 2022-05-24 PROCEDURE — G8510 SCR DEP NEG, NO PLAN REQD: HCPCS | Performed by: FAMILY MEDICINE

## 2022-05-24 PROCEDURE — G9899 SCRN MAM PERF RSLTS DOC: HCPCS | Performed by: FAMILY MEDICINE

## 2022-05-24 PROCEDURE — 1101F PT FALLS ASSESS-DOCD LE1/YR: CPT | Performed by: FAMILY MEDICINE

## 2022-05-24 PROCEDURE — G8536 NO DOC ELDER MAL SCRN: HCPCS | Performed by: FAMILY MEDICINE

## 2022-05-24 PROCEDURE — G8427 DOCREV CUR MEDS BY ELIG CLIN: HCPCS | Performed by: FAMILY MEDICINE

## 2022-05-24 PROCEDURE — 99214 OFFICE O/P EST MOD 30 MIN: CPT | Performed by: FAMILY MEDICINE

## 2022-05-24 PROCEDURE — 3017F COLORECTAL CA SCREEN DOC REV: CPT | Performed by: FAMILY MEDICINE

## 2022-05-24 PROCEDURE — 1090F PRES/ABSN URINE INCON ASSESS: CPT | Performed by: FAMILY MEDICINE

## 2022-05-24 PROCEDURE — G8420 CALC BMI NORM PARAMETERS: HCPCS | Performed by: FAMILY MEDICINE

## 2022-05-24 RX ORDER — ALENDRONATE SODIUM 70 MG/1
TABLET ORAL
Qty: 12 TABLET | Refills: 3 | Status: SHIPPED | OUTPATIENT
Start: 2022-05-24

## 2022-05-24 NOTE — PROGRESS NOTES
Chief Complaint   Patient presents with    New Patient     To get established , and discuss osteoporosis what suppose to get reclst infusion in 11/2021 but never got it because she had covid . also would like to discuss high cholestorol and get cartoid doppler done      1. Have you been to the ER, urgent care clinic since your last visit? Hospitalized since your last visit? No    2. Have you seen or consulted any other health care providers outside of the [address] [place] [address] since your last visit? Include any pap smears or colon screening. No       3 most recent PHQ Screens 5/24/2022   Little interest or pleasure in doing things Not at all   Feeling down, depressed, irritable, or hopeless Not at all   Total Score PHQ 2 0       Fall Risk Assessment, last 12 mths 5/24/2022   Able to walk? Yes   Fall in past 12 months? 1   Do you feel unsteady? 0   Are you worried about falling 0   Is TUG test greater than 12 seconds? 0   Is the gait abnormal? 0   Number of falls in past 12 months 1   Fall with injury?  1

## 2022-05-24 NOTE — PATIENT INSTRUCTIONS
Hyperlipidemia: After Your Visit  Your Care Instructions  Hyperlipidemia is too much fat in your blood. The body has several kinds of fat, including cholesterol and triglycerides. Your body needs fat for many things, such as making new cells. But too much fat in your blood increases your chances of having a heart attack or stroke. You may be able to lower your cholesterol and triglycerides with a heart-healthy diet, exercise, and if needed, medicine. Your doctor may want you to try lifestyle changes first to see whether they lower the fat in your blood. You may need to take medicine if lifestyle changes do not lower the fat in your blood enough. Follow-up care is a key part of your treatment and safety. Be sure to make and go to all appointments, and call your doctor if you are having problems. Its also a good idea to know your test results and keep a list of the medicines you take. How can you care for yourself at home? Take your medicines  · Take your medicines exactly as prescribed. Call your doctor if you think you are having a problem with your medicine. · If you take medicine to lower your cholesterol, go to follow-up visits. You will need to have blood tests. · Do not take large doses of niacin, which is a B vitamin, while taking medicine called statins. It may increase the chance of muscle pain and liver problems. · Talk to your doctor about avoiding grapefruit juice if you are taking statins. Grapefruit juice can raise the level of this medicine in your blood. This could increase side effects. Eat more fruits, vegetables, and fiber  · Fruits and vegetables have lots of nutrients that help protect against heart disease, and they have little--if any--fat. Try to eat at least five servings a day. Dark green, deep orange, or yellow fruits and vegetables are healthy choices. · Keep carrots, celery, and other veggies handy for snacks.  Buy fruit that is in season and store it where you can see it so that you will be tempted to eat it. Cook dishes that have a lot of veggies in them, such as stir-fries and soups. · Foods high in fiber may reduce your cholesterol and provide important vitamins and minerals. High-fiber foods include whole-grain cereals and breads, oatmeal, beans, brown rice, citrus fruits, and apples. · Buy whole-grain breads and cereals instead of white bread and pastries. Limit saturated fat  · Read food labels and try to avoid saturated fat and trans fat. They increase your risk of heart disease. · Use olive or canola oil when you cook. Try cholesterol-lowering spreads, such as Benecol or Take Control. · Bake, broil, grill, or steam foods instead of frying them. · Limit the amount of high-fat meats you eat, including hot dogs and sausages. Cut out all visible fat when you prepare meat. · Eat fish, skinless poultry, and soy products such as tofu instead of high-fat meats. Soybeans may be especially good for your heart. Eat at least two servings of fish a week. Certain fish, such as salmon, contain omega-3 fatty acids, which may help reduce your risk of heart attack. · Choose low-fat or fat-free milk and dairy products. Get exercise, limit alcohol, and quit smoking  · Get more exercise. Work with your doctor to set up an exercise program. Even if you can do only a small amount, exercise will help you get stronger, have more energy, and manage your weight and your stress. Walking is an easy way to get exercise. Gradually increase the amount you walk every day. Aim for at least 30 minutes on most days of the week. You also may want to swim, bike, or do other activities. · Limit alcohol to no more than 2 drinks a day for men and 1 drink a day for women. · Do not smoke. If you need help quitting, talk to your doctor about stop-smoking programs and medicines. These can increase your chances of quitting for good. When should you call for help?   Call 911 anytime you think you may need emergency care. For example, call if:  · You have symptoms of a heart attack. These may include:  ¨ Chest pain or pressure, or a strange feeling in the chest.  ¨ Sweating. ¨ Shortness of breath. ¨ Nausea or vomiting. ¨ Pain, pressure, or a strange feeling in the back, neck, jaw, or upper belly or in one or both shoulders or arms. ¨ Lightheadedness or sudden weakness. ¨ A fast or irregular heartbeat. After you call 911, the  may tell you to chew 1 adult-strength or 2 to 4 low-dose aspirin. Wait for an ambulance. Do not try to drive yourself. · You have signs of a stroke. These may include:  ¨ Sudden numbness, paralysis, or weakness in your face, arm, or leg, especially on only one side of your body. ¨ New problems with walking or balance. ¨ Sudden vision changes. ¨ Drooling or slurred speech. ¨ New problems speaking or understanding simple statements, or feeling confused. ¨ A sudden, severe headache that is different from past headaches. · You passed out (lost consciousness). Call your doctor now or seek immediate medical care if:  · You have muscle pain or weakness. Watch closely for changes in your health, and be sure to contact your doctor if:  · You are very tired. · You have an upset stomach, gas, constipation, or belly pain or cramps. Where can you learn more? Go to [employer]  Enter C406 in the search box to learn more about \"Hyperlipidemia: After Your Visit. \"   © 6642-2627 [employer]. Care instructions adapted under license by [employer] (which disclaims liability or warranty for this information). This care instruction is for use with your licensed healthcare professional. If you have questions about a medical condition or this instruction, always ask your healthcare professional. [place] [address] any warranty or liability for your use of this information.   Content Version: 8.1.393945; Last Revised: October 13, 2011                   Osteoporosis: Care Instructions  Overview     Osteoporosis causes bones to become thin and weak. It is much more common in women than in men. Your chances of getting this disease depend on several things. These factors include the thickness of your bones (bone density), as well as health, diet, and physical activity. This disease may be very advanced before you know you have it. Sometimes the first sign is a broken bone in the hip, spine, or wrist. Or you may have sudden pain in your middle or lower back. Follow-up care is a key part of your treatment and safety. Be sure to make and go to all appointments, and call your doctor if you are having problems. It's also a good idea to know your test results and keep a list of the medicines you take. How can you care for yourself at home? · Your doctor may prescribe a bisphosphonate, such as risedronate (Actonel) or alendronate (Fosamax), for osteoporosis. If you are taking one of these medicines by mouth:  ? Take your medicine with a full glass of water when you first get up in the morning. ? Do not lie down, eat, drink a beverage, or take any other medicine for at least 30 minutes after taking the drug. This helps prevent stomach problems. ? Do not take your medicine late in the day if you forgot to take it in the morning. Skip it, and take the usual dose the next morning. ? If you have side effects, tell your doctor. Your doctor may prescribe another medicine. · Get enough calcium and vitamin D. The recommended dietary allowances (RDAs) for adults younger than age 46 are 1,000 mg of calcium and 600 IU of vitamin D each day. Women ages 46 to 79 need 1,200 mg of calcium and 600 IU of vitamin D each day. Men ages 46 to 79 need 1,000 mg of calcium and 600 IU of vitamin D each day. Adults 71 and older need 1,200 mg of calcium and 800 IU of vitamin D each day.  It's not clear if people who already have osteoporosis need more calcium and vitamin D than this. Talk to your doctor about what's right for you.  ? Eat foods rich in calcium, like yogurt, cheese, milk, and dark green vegetables. This is a good way to get the calcium you need. You can get vitamin D from eggs, fatty fish, cereal, and milk. ? Ask your doctor if you need to take a calcium plus vitamin D supplement. You may be able to get enough calcium and vitamin D through your diet. Be careful with supplements. Adults ages 23 to 48 should not get more than 2,500 mg of calcium and 4,000 IU of vitamin D each day, whether it is from supplements and/or food. Adults ages 46 and older should not get more than 2,000 mg of calcium and 4,000 IU of vitamin D each day from supplements and/or food. · Limit alcohol to 2 drinks a day for men and 1 drink a day for women. Too much alcohol can cause health problems. · Do not smoke. Smoking puts you at a much higher risk for osteoporosis. If you need help quitting, talk to your doctor about stop-smoking programs and medicines. These can increase your chances of quitting for good. · Get regular bone-building exercise. Weight-bearing and resistance exercises keep bones healthy by working the muscles and bones against gravity. Start out at an exercise level that feels right for you. Add a little at a time until you can do the following:  ? Do 30 minutes of weight-bearing exercise on most days of the week. Walking, jogging, stair climbing, and dancing are good choices. ? Do resistance exercises with weights or elastic bands 2 to 3 days a week. · Reduce your risk of falls:  ? Wear supportive shoes with low heels and nonslip soles. ? Use a cane or walker, if you need it. Use shower chairs and bath benches. Put in handrails on stairways, around your shower or tub area, and near the toilet. ? Keep stairs, porches, and walkways well lit. Use night-lights. ? Remove throw rugs and other objects that are in the way. ? Avoid icy, wet, or slippery surfaces.   When should you call for help? Watch closely for changes in your health, and be sure to contact your doctor if you have any problems. Where can you learn more? Go to http://www.gray.com/  Enter K100 in the search box to learn more about \"Osteoporosis: Care Instructions. \"  Current as of: September 8, 2021               Content Version: 13.2  © 5160-1425 [employer]. Care instructions adapted under license by [employer] (which disclaims liability or warranty for this information). If you have questions about a medical condition or this instruction, always ask your healthcare professional. [place] [address] any warranty or liability for your use of this information.

## 2022-05-24 NOTE — PROGRESS NOTES
Subjective  Chief Complaint   Patient presents with    New Patient     To get established , and discuss osteoporosis what suppose to get reclst infusion in 11/2021 but never got it because she had covid . also would like to discuss high cholestorol and get cartoid doppler done      HPI:  Darcie Crockett is a 81 y.o. female. Patient is transferring care from one of our sister offices. She reports that she was supposed to have Reclast infusion started back in November but she had COVID around that time so the infusion was canceled. She states that she has never been on any other medication including orals for osteoporosis history. She also states that she has has a history of high cholesterol and has historically declined all medications for this as well. She tells me that she had heart scan done in her score was at a perfect 0. She states that she would like to do a carotid ultrasound before deciding if she is willing to start a lipid-lowering medication. No other concerns or complaints today. Past Medical History:   Diagnosis Date    Hypercholesterolemia     Nausea & vomiting     Osteopenia      Family History   Problem Relation Age of Onset   [facility] Cancer Father         lungs -- chemical exposure    Elevated Lipids Mother     OSTEOARTHRITIS Mother     No Known Problems Sister     No Known Problems Daughter     No Known Problems Son      Social History     Socioeconomic History    Marital status: SINGLE     Spouse name: Not on file    Number of children: Not on file    Years of education: Not on file    Highest education level: Not on file   Occupational History    Not on file   Tobacco Use    Smoking status: Never Smoker    Smokeless tobacco: Never Used   Vaping Use    Vaping Use: Never used   Substance and Sexual Activity    Alcohol use:  Yes     Alcohol/week: 0.0 standard drinks     Comment: Occasional     Drug use: No    Sexual activity: Yes     Partners: Female   Other Topics Concern    Not on file   Social History Narrative    Not on file     Social Determinants of Health     Financial Resource Strain:     Difficulty of Paying Living Expenses: Not on file   Food Insecurity:     Worried About Running Out of Food in the Last Year: Not on file    Gilberto of Food in the Last Year: Not on file   Transportation Needs:     Lack of Transportation (Medical): Not on file    Lack of Transportation (Non-Medical): Not on file   Physical Activity:     Days of Exercise per Week: Not on file    Minutes of Exercise per Session: Not on file   Stress:     Feeling of Stress : Not on file   Social Connections:     Frequency of Communication with Friends and Family: Not on file    Frequency of Social Gatherings with Friends and Family: Not on file    Attends Sikhism Services: Not on file    Active Member of [address] [place] [address] [employer] or Organizations: Not on file    Attends Club or Organization Meetings: Not on file    Marital Status: Not on file   Intimate Partner Violence:     Fear of Current or Ex-Partner: Not on file    Emotionally Abused: Not on file    Physically Abused: Not on file    Sexually Abused: Not on file   Housing Stability:     Unable to Pay for Housing in the Last Year: Not on file    Number of [place] in the Last Year: Not on file    Unstable Housing in the Last Year: Not on file     Current Outpatient Medications on File Prior to Visit   Medication Sig Dispense Refill    glucosam/chond/collagen/hyalur (JOINT SUPPORT PO) Take 1 Tablet by mouth daily.  TURMERIC PO Take 1 Tablet by mouth daily.  vitamin B complex (SUPER B COMPLEX-B-12 PO) Take 1 Tablet by mouth daily.  calcium-cholecalciferol, d3, (CALCIUM 600 + D) 600-125 mg-unit tab Take 1 Tablet by mouth daily.  OTHER Take 1 Tablet by mouth daily. prevagen      elderberry fruit (ELDERBERRY PO) Take 1 Tablet by mouth daily.       cholecalciferol (Vitamin D3) (5000 Units/125 mcg) tab tablet Take 5,000 Units by mouth daily.      MULTIVIT-MIN/IRON/FOLIC/LUTEIN (CENTRUM SILVER WOMEN PO) Take 1 Tab by mouth daily.  ascorbic acid/collagen hydr (ASCORBIC ACID-COLLAGEN PO) Take  by mouth. (Patient not taking: Reported on 5/24/2022)      [DISCONTINUED] ascorbic acid (VITAMIN C PO) Take  by mouth. (Patient not taking: Reported on 5/24/2022)      [DISCONTINUED] glucosamine-chondroitin (ELATION) 1,500-1,200 mg/30 mL liqd Take 1 Tab by mouth daily. (Patient not taking: Reported on 5/24/2022)       No current facility-administered medications on file prior to visit. Allergies   Allergen Reactions    Benzonatate Other (comments)     headache    Codeine Other (comments)     Migraine per pt. Review of Systems   Constitutional: Negative for chills, fever and malaise/fatigue. Respiratory: Negative for cough, shortness of breath and wheezing. Cardiovascular: Negative for chest pain, palpitations and leg swelling. Gastrointestinal: Negative for diarrhea and vomiting. Genitourinary: Negative for dysuria. Neurological: Negative for dizziness, tingling and weakness. Psychiatric/Behavioral: Negative for depression. The patient is not nervous/anxious. Objective  Visit Vitals  /62 (BP 1 Location: Left upper arm, BP Patient Position: At rest, BP Cuff Size: Adult)   Pulse 60   Temp 97.1 °F (36.2 °C) (Temporal)   Ht 5' 4\" (1.626 m)   Wt 134 lb (60.8 kg)   SpO2 100%   BMI 23.00 kg/m²     Physical Exam  Constitutional:       General: She is not in acute distress. Appearance: Normal appearance. She is normal weight. HENT:      Head: Normocephalic and atraumatic. Neck:      Thyroid: No thyroid mass or thyromegaly. Cardiovascular:      Rate and Rhythm: Normal rate and regular rhythm. Heart sounds: No murmur heard. Pulmonary:      Effort: Pulmonary effort is normal. No respiratory distress. Breath sounds: Normal breath sounds. No wheezing. Musculoskeletal:      Cervical back: Neck supple.  No muscular tenderness. Right lower leg: No edema. Left lower leg: No edema. Lymphadenopathy:      Cervical: No cervical adenopathy. Skin:     General: Skin is warm and dry. Neurological:      Mental Status: She is alert and oriented to person, place, and time. Mental status is at baseline. Psychiatric:         Attention and Perception: Attention and perception normal.         Mood and Affect: Mood and affect normal.         Speech: Speech normal.         Behavior: Behavior normal.          Assessment & Plan    ICD-10-CM ICD-9-CM    1. Mixed hyperlipidemia  E78.2 272.2 LIPID PANEL      METABOLIC PANEL, COMPREHENSIVE      CBC WITH AUTOMATED DIFF   2. Age-related osteoporosis without current pathological fracture  M81.0 733.01 alendronate (FOSAMAX) 70 mg tablet     Diagnoses and all orders for this visit:    1. Mixed hyperlipidemia  -     LIPID PANEL  -     METABOLIC PANEL, COMPREHENSIVE  -     CBC WITH AUTOMATED DIFF  We reviewed that there is not a clinical indication for carotid ultrasound at this time so her insurance is not going to cover this testing. I did give her the website to look into having [facility] screening performed and she states that she will proceed through them for this test.  I am entering orders for her to recheck labs prior to her next visit. We reviewed that her current ASCVD risk score is at 8.3%. We reviewed what this means and the recommendations to be on a statin as a result. She understands risks but would like to have the carotid ultrasound information first.  Diet tips on cholesterol in general and good versus bad versus neutral dietary fats reviewed. 2. Age-related osteoporosis without current pathological fracture  -     alendronate (FOSAMAX) 70 mg tablet; Take one tab weekly in the morning at least 30 minutes before the first food, beverage (except plain water), or other medications. Patient is agreeable to starting Fosamax for bone health.   We also reviewed the importance of weightbearing exercise and regular calcium plus vitamin D intake. Follow-up and Dispositions    · Return in about 5 months (around 10/24/2022) for [address], NON fasting follow up.        Blossom Dowling MD

## 2022-10-13 NOTE — IP AVS SNAPSHOT
[facility] 
 
 
 [address] 
[phone] Patient: Jana Templeton MRN: [id number] HKO:[id number] About your hospitalization You were admitted on:  February 21, 2018 You last received care in the:  [facility] PACU You were discharged on:  February 21, 2018 Why you were hospitalized Your primary diagnosis was:  Not on File Follow-up Information Follow up With Details Comments Contact Info Mone Arenas MD   [address] [facility] [address] 
[phone] Your Scheduled Appointments Friday March 23, 2018  9:30 AM EDT Medicare Physical with Melyssa Haney MD  
[facility] [address]) [address] Clora Alar [address] [place] [address]  
[phone] Discharge Orders None A check bijal indicates which time of day the medication should be taken. My Medications CONTINUE taking these medications Instructions Each Dose to Equal  
 Morning Noon Evening Bedtime BENADRYL 25 mg capsule Generic drug:  diphenhydrAMINE Your last dose was: Your next dose is: Take 25 mg by mouth nightly. 25 mg CENTRUM SILVER WOMEN PO Your last dose was: Your next dose is: Take 1 Tab by mouth daily. 1 Tab  
    
   
   
   
  
 ginkgo biloba 120 mg Tab Your last dose was: Your next dose is: Take 1 Tab by mouth daily. 1 Tab  
    
   
   
   
  
 glucosamine-chondroitin 1,500-1,200 mg/30 mL Liqd Commonly known as:  ELATION Your last dose was: Your next dose is: Take 1 Tab by mouth daily. 1 Tab SUPER B-50 COMPLEX PO Your last dose was: Your next dose is: Take 1 Tab by mouth daily. 1 Tab vitamin E 400 unit capsule Commonly known as:  [address] Your last dose was: Your next dose is: Take 400 Units by mouth daily. 400 Units ASK your doctor about these medications Instructions Each Dose to Equal  
 Morning Noon Evening Bedtime  
 oseltamivir 75 mg capsule Commonly known as:  TAMIFLU Your last dose was: Your next dose is: Take 75 mg by mouth daily. 75 mg Discharge Instructions PATIENT INSTRUCTIONS:  
 
After general anesthesia or intravenous sedation, for 24 hours or while taking prescription Narcotics: · Limit your activities · Do not drive and operate hazardous machinery · Do not make important personal or business decisions · Do  not drink alcoholic beverages · If you have not urinated within 8 hours after discharge, please contact your surgeon on call. Report the following to your surgeon: 
· Excessive pain, swelling, redness or odor of or around the surgical area · Temperature over 100.5 · Nausea and vomiting lasting longer than 4 hours or if unable to take medications · Any signs of decreased circulation or nerve impairment to extremity: change in color, persistent  numbness, tingling, coldness or increase pain · Any questions [address] Breathing deep and coughing are very important exercises to do after surgery. Deep breathing and coughing open the little air tubes and air sacks in your lungs. You take deep breaths every day. You may not even notice - it is just something you do when you sigh or yawn. It is a natural exercise you do to keep these air passages open. After surgery, take deep breaths and cough, on purpose. Coughing and deep breathing help prevent bronchitis and pneumonia after surgery.   If you had chest or belly surgery, use a pillow as a \"hug buddy\" and hold it tightly to your chest or belly when you cough. DIRECTIONS: 
6. Take 10 to 15 slow deep breaths every hour while awake. 7. Breathe in deeply, and hold it for 2 seconds. 8. Exhale slowly through puckered lips, like blowing up a balloon. 9. After every 4th or 5th deep breath, hug your pillow to your chest or belly and give a hard, deep cough. Yes, it will probably hurt. But doing this exercise is very important part of healing after surgery. Take your pain medicine to help you do this exercise without too much pain. IF YOU HAVE BEEN DIAGNOSED WITH SLEEP APNEA, PLEASE USE YOUR SLEEP APNEA DEVICE OR CPAP MACHINE WHEN YOU INTEND TO NAP AFTER TAKING PAIN MEDICATION. Ankle Pumps Ankle pumps increase the circulation of oxygenated blood to your lower extremities and decrease your risk for circulation problems such as blood clots. They also stretch the muscles, tendons and ligaments in your foot and ankle, and prevent joint contracture in the ankle and foot, especially after surgeries on the legs. It is important to do ankle pump exercises regularly after surgery because immobility increases your risk for developing a blood clot. Your doctor may also have you take an Aspirin for the next few days as well. If your doctor did not ask you to take an Aspirin, consult with him before starting Aspirin therapy on your own. Slowly point your foot forward, feeling the muscles on the top of your lower leg stretch, and hold this position for 5 seconds. Next, pull your foot back toward you as far as possible, stretching the calf muscles, and hold that position for 5 seconds. Repeat with the other foot. Perform 10 repetitions every hour while awake for both ankles if possible (down and then up with the foot once is one repetition).  
 
You should feel gentle stretching of the muscles in your lower leg when doing this exercise. If you feel pain, or your range of motion is limited, don't  Push too hard. Only go the limit your joint and muscles will let you go. If you have increasing pain, progressively worsening leg warmth or swelling, STOP the exercise and call your doctor. About Smoking No smoking / No tobacco products / Avoid exposure to second hand smoke Surgeon General's Warning:  Quitting smoking now greatly reduces serious risk to your health. Obesity, smoking, and sedentary lifestyle greatly increases your risk for illness and disease. A healthy diet, regular physical exercise & weight monitoring are important for maintaining a healthy lifestyle. Congestive Heart Failure You may be retaining fluid if you have a history of heart failure or if you experience any of the following symptoms:  Weight gain of 3 pounds or more overnight or 5 pounds in a week, increased swelling in our hands or feet or shortness of breath while lying flat in bed. Please call your doctor as soon as you notice any of these symptoms; do not wait until your next office visit. Recognize signs and symptoms of STROKE: 
F - face looks uneven A - arms unable to move or move even S - speech slurred or non-existent T - time-call 911 as soon as signs and symptoms begin-DO NOT go Back to bed or wait to see if you get better-TIME IS BRAIN. Warning signs of HEART ATTACK Call 911 if you have these symptoms · Chest discomfort. Most heart attacks involve discomfort in the center of the chest that lasts more than a few minutes, or that goes away and comes back. It can feel like uncomfortable pressure, squeezing, fullness, or pain. · Discomfort in other areas of the upper body. Symptoms can include pain or discomfort in one or both Arms, the back, neck, jaw, or stomach. ·  Shortness of breath with or without chest discomfort.  
· Other signs may include breaking out in a cold sweat, nausea, or lightheadedness Don't wait more than five minutes to call 911 - MINUTES MATTER! Fast action can save your life. Calling 911 is almost always the fastest way to get lifesaving treatment. Emergency Medical Services staff can begin treatment when they arrive - up to an hour sooner than if someone gets to the hospital by car. [facility] MEDICATION AND SIDE EFFECT GUIDE The [address] EFFECT GUIDE was provided to the PATIENT AND CARE PROVIDER. Information provided includes instruction about drug purpose and common side effects for the following medications: 
 
oxycodone Introducing [facility]! Dear Lima Valdes: Thank you for requesting a [employer] account. Our records indicate that you already have an active [employer] account. You can access your account anytime at [email or web address] Did you know that you can access your hospital and ER discharge instructions at any time in [employer]? You can also review all of your test results from your hospital stay or ER visit. Additional Information If you have questions, please visit the Frequently Asked Questions section of the [employer] website at [email or web address] Remember, [employer] is NOT to be used for urgent needs. For medical emergencies, dial 911. Now available from your iPhone and Android! Providers Seen During Your Hospitalization Provider Specialty Primary office phone Kapil Zamora MD Orthopedic Surgery [phone] Your Primary Care Physician (PCP) Primary Care Physician Office Phone Office Fax [facility], [address] [phone] You are allergic to the following Allergen Reactions Benzonatate Other (comments)  
 headache Codeine Other (comments) Migraine per pt. Recent Documentation Height Weight BMI OB Status Smoking Status 1.626 m 74.5 kg 28.19 kg/m2 Hysterectomy Never Smoker Emergency Contacts Name Discharge Info Relation Home Work Mobile Maxine Bermudez CAREGIVER [3] Life Partner [7] [phone] Patient Belongings The following personal items are in your possession at time of discharge: 
  Dental Appliances: Other (comment) (missing teeth left lower back)  Visual Aid: Glasses      Home Medications: None   Jewelry: None  Clothing: Other (comment) (clothing to locker)    Other Valuables: Eyeglasses (to be given to partner) Please provide this summary of care documentation to your next provider. Signatures-by signing, you are acknowledging that this After Visit Summary has been reviewed with you and you have received a copy. Patient Signature:  ____________________________________________________________ Date:  ____________________________________________________________  
  
Estuardo Can Provider Signature:  ____________________________________________________________ Date:  ____________________________________________________________ Simple Simulation Preamble Text Will Be Included With Simple Simulations (.......... Indications): Simple simulation was performed today for the following reasons:

## 2022-10-22 LAB
ALBUMIN SERPL-MCNC: 4.7 G/DL (ref 3.8–4.8)
ALBUMIN/GLOB SERPL: 2 {RATIO} (ref 1.2–2.2)
ALP SERPL-CCNC: 31 IU/L (ref 44–121)
ALT SERPL-CCNC: 18 IU/L (ref 0–32)
AST SERPL-CCNC: 28 IU/L (ref 0–40)
BASOPHILS # BLD AUTO: 0.1 X10E3/UL (ref 0–0.2)
BASOPHILS NFR BLD AUTO: 1 %
BILIRUB SERPL-MCNC: 0.7 MG/DL (ref 0–1.2)
BUN SERPL-MCNC: 14 MG/DL (ref 8–27)
BUN/CREAT SERPL: 19 (ref 12–28)
CALCIUM SERPL-MCNC: 9.2 MG/DL (ref 8.7–10.3)
CHLORIDE SERPL-SCNC: 100 MMOL/L (ref 96–106)
CHOLEST SERPL-MCNC: 251 MG/DL (ref 100–199)
CO2 SERPL-SCNC: 24 MMOL/L (ref 20–29)
CREAT SERPL-MCNC: 0.75 MG/DL (ref 0.57–1)
EGFR: 86 ML/MIN/1.73
EOSINOPHIL # BLD AUTO: 0.2 X10E3/UL (ref 0–0.4)
EOSINOPHIL NFR BLD AUTO: 3 %
ERYTHROCYTE [DISTWIDTH] IN BLOOD BY AUTOMATED COUNT: 11.9 % (ref 11.7–15.4)
GLOBULIN SER CALC-MCNC: 2.3 G/DL (ref 1.5–4.5)
GLUCOSE SERPL-MCNC: 77 MG/DL (ref 70–99)
HCT VFR BLD AUTO: 41.9 % (ref 34–46.6)
HDLC SERPL-MCNC: 75 MG/DL
HGB BLD-MCNC: 13.8 G/DL (ref 11.1–15.9)
IMM GRANULOCYTES # BLD AUTO: 0 X10E3/UL (ref 0–0.1)
IMM GRANULOCYTES NFR BLD AUTO: 0 %
LDLC SERPL CALC-MCNC: 158 MG/DL (ref 0–99)
LYMPHOCYTES # BLD AUTO: 2.1 X10E3/UL (ref 0.7–3.1)
LYMPHOCYTES NFR BLD AUTO: 39 %
MCH RBC QN AUTO: 31 PG (ref 26.6–33)
MCHC RBC AUTO-ENTMCNC: 32.9 G/DL (ref 31.5–35.7)
MCV RBC AUTO: 94 FL (ref 79–97)
MONOCYTES # BLD AUTO: 0.6 X10E3/UL (ref 0.1–0.9)
MONOCYTES NFR BLD AUTO: 12 %
NEUTROPHILS # BLD AUTO: 2.3 X10E3/UL (ref 1.4–7)
NEUTROPHILS NFR BLD AUTO: 45 %
PLATELET # BLD AUTO: 189 X10E3/UL (ref 150–450)
POTASSIUM SERPL-SCNC: 3.9 MMOL/L (ref 3.5–5.2)
PROT SERPL-MCNC: 7 G/DL (ref 6–8.5)
RBC # BLD AUTO: 4.45 X10E6/UL (ref 3.77–5.28)
SODIUM SERPL-SCNC: 138 MMOL/L (ref 134–144)
TRIGL SERPL-MCNC: 104 MG/DL (ref 0–149)
VLDLC SERPL CALC-MCNC: 18 MG/DL (ref 5–40)
WBC # BLD AUTO: 5.2 X10E3/UL (ref 3.4–10.8)

## 2022-10-25 RX ORDER — ATORVASTATIN CALCIUM 10 MG/1
10 TABLET, FILM COATED ORAL
Qty: 30 TABLET | Refills: 3 | Status: SHIPPED | OUTPATIENT
Start: 2022-10-25

## 2022-10-27 ENCOUNTER — VIRTUAL VISIT (OUTPATIENT)
Dept: FAMILY MEDICINE CLINIC | Age: 72
End: 2022-10-27
Payer: MEDICARE

## 2022-10-27 DIAGNOSIS — E78.00 PURE HYPERCHOLESTEROLEMIA: ICD-10-CM

## 2022-10-27 DIAGNOSIS — Z00.00 WELLNESS EXAMINATION: Primary | ICD-10-CM

## 2022-10-27 DIAGNOSIS — Z13.39 ALCOHOL SCREENING: ICD-10-CM

## 2022-10-27 DIAGNOSIS — Z12.11 COLON CANCER SCREENING: ICD-10-CM

## 2022-10-27 DIAGNOSIS — Z12.31 BREAST CANCER SCREENING BY MAMMOGRAM: ICD-10-CM

## 2022-10-27 DIAGNOSIS — Z13.31 DEPRESSION SCREENING: ICD-10-CM

## 2022-10-27 DIAGNOSIS — M25.512 ACUTE PAIN OF LEFT SHOULDER: ICD-10-CM

## 2022-10-27 PROCEDURE — 3017F COLORECTAL CA SCREEN DOC REV: CPT | Performed by: FAMILY MEDICINE

## 2022-10-27 PROCEDURE — G0439 PPPS, SUBSEQ VISIT: HCPCS | Performed by: FAMILY MEDICINE

## 2022-10-27 PROCEDURE — G8420 CALC BMI NORM PARAMETERS: HCPCS | Performed by: FAMILY MEDICINE

## 2022-10-27 PROCEDURE — 99214 OFFICE O/P EST MOD 30 MIN: CPT | Performed by: FAMILY MEDICINE

## 2022-10-27 PROCEDURE — G8536 NO DOC ELDER MAL SCRN: HCPCS | Performed by: FAMILY MEDICINE

## 2022-10-27 PROCEDURE — 1090F PRES/ABSN URINE INCON ASSESS: CPT | Performed by: FAMILY MEDICINE

## 2022-10-27 PROCEDURE — 1123F ACP DISCUSS/DSCN MKR DOCD: CPT | Performed by: FAMILY MEDICINE

## 2022-10-27 PROCEDURE — 1101F PT FALLS ASSESS-DOCD LE1/YR: CPT | Performed by: FAMILY MEDICINE

## 2022-10-27 PROCEDURE — G8510 SCR DEP NEG, NO PLAN REQD: HCPCS | Performed by: FAMILY MEDICINE

## 2022-10-27 PROCEDURE — G8427 DOCREV CUR MEDS BY ELIG CLIN: HCPCS | Performed by: FAMILY MEDICINE

## 2022-10-27 PROCEDURE — G9899 SCRN MAM PERF RSLTS DOC: HCPCS | Performed by: FAMILY MEDICINE

## 2022-10-27 NOTE — PROGRESS NOTES
Chief Complaint   Patient presents with    Annual Wellness Visit     1. Have you been to the ER, urgent care clinic since your last visit? Hospitalized since your last visit? No    2. Have you seen or consulted any other health care providers outside of the [address] [place] [address] since your last visit? Include any pap smears or colon screening. No      3 most recent PHQ Screens 10/27/2022   Little interest or pleasure in doing things Not at all   Feeling down, depressed, irritable, or hopeless Not at all   Total Score PHQ 2 0       Fall Risk Assessment, last 12 mths 10/27/2022   Able to walk? Yes   Fall in past 12 months? 0   Do you feel unsteady? 0   Are you worried about falling 0   Is TUG test greater than 12 seconds? -   Is the gait abnormal? -   Number of falls in past 12 months -   Fall with injury? -       Patient would like to use email: [email or web address] for her visit today

## 2022-10-27 NOTE — PROGRESS NOTES
Consent: Annalisa Davis, was evaluated through a synchronous (real-time) audio-video encounter. The patient (or guardian if applicable) is aware that this is a billable service, which includes applicable co-pays. This Virtual Visit was conducted with patient's (and/or legal guardian's) consent. The visit was conducted pursuant to the emergency declaration under the [facility] [address] and the [employer] and [employer] Act. Patient identification was verified, and a caregiver was present when appropriate. The patient was located in a state where the provider was licensed to provide care. 712  Subjective:   Annalisa Davis is a 81 y.o. female who was seen for Annual Wellness Visit    She is also following up since having labs drawn last week. CBC, CMP, and lipids were only remarkable for the elevated cholesterol and ASCVD risk score at 8.2%. She has not started the atorvastatin to date. She was disappointed to see that she did not make improvement in the numbers with the diet and exercise efforts that she has been doing. She also notes that for the last few months she has been waking up with pain in her left shoulder if she finds her self sleeping on that side. She needs to rotate it out before it starts feeling better. She started using some CBD Gummies from a friend that seem to be helping. Fall Screen is completed and assessed=yes. Depression Screen is completed and assessed=yes  Medication list reviewed and adjusted for accuracy=yes  Immunizations reviewed and updated=yes  Health/Preventative Screenings reviewed and updated=yes  ADL Functions reviewed=yes  MiniCog Score= 4/5 (2 points for clock and 2/3 points for recall)   See scanned medicare wellness documents for full details. Prior to Admission medications    Medication Sig Start Date End Date Taking?  Authorizing Provider glucosam/chond/collagen/hyalur (JOINT SUPPORT PO) Take 1 Tablet by mouth daily. Yes Provider, Historical   TURMERIC PO Take 1 Tablet by mouth daily. Yes Provider, Historical   alendronate (FOSAMAX) 70 mg tablet Take one tab weekly in the morning at least 30 minutes before the first food, beverage (except plain water), or other medications. 5/24/22  Yes Gaviota Yu MD   vitamin B complex (SUPER B COMPLEX-B-12 PO) Take 1 Tablet by mouth daily. Yes Provider, Historical   calcium-cholecalciferol, d3, (CALCIUM 600 + D) 600-125 mg-unit tab Take 1 Tablet by mouth daily. Yes Provider, Historical   OTHER Take 1 Tablet by mouth daily. prevagen   Yes Provider, Historical   elderberry fruit (ELDERBERRY PO) Take 1 Tablet by mouth daily. Yes Provider, Historical   cholecalciferol (VITAMIN D3) (5000 Units/125 mcg) tab tablet Take 5,000 Units by mouth daily. Yes Provider, Historical   MULTIVIT-MIN/IRON/FOLIC/LUTEIN (CENTRUM SILVER WOMEN PO) Take 1 Tab by mouth daily. Yes Provider, Historical   atorvastatin (LIPITOR) 10 mg tablet Take 1 Tablet by mouth nightly. Patient not taking: Reported on 10/27/2022 10/25/22   Gaviota Yu MD   ascorbic acid/collagen hydr (ASCORBIC ACID-COLLAGEN PO) Take  by mouth. Patient not taking: No sig reported  10/27/22  Provider, Historical     Allergies   Allergen Reactions    Benzonatate Other (comments)     headache    Codeine Other (comments)     Migraine per pt. Patient Active Problem List    Diagnosis    Age-related osteoporosis without current pathological fracture       Objective:   Vital Signs: (As obtained by patient/caregiver at home)  There were no vitals taken for this visit.      [INSTRUCTIONS:  \"[x]\" Indicates a positive item  \"[]\" Indicates a negative item  -- DELETE ALL ITEMS NOT EXAMINED]    Constitutional: [x] Appears well-developed and well-nourished [x] No apparent distress      [] Abnormal -     Mental status: [x] Alert and awake  [x] Oriented to person/place/time [x] Able to follow commands    [] Abnormal -     Eyes:   EOM    [x]  Normal    [] Abnormal -   Sclera  [x]  Normal    [] Abnormal -          Discharge [x]  None visible   [] Abnormal -     HENT: [x] Normocephalic, atraumatic  [] Abnormal -   [] Mouth/Throat: Mucous membranes are moist    External Ears [] Normal  [] Abnormal -    Neck: [x] No visualized mass [] Abnormal -     Pulmonary/Chest: [x] Respiratory effort normal   [x] No visualized signs of difficulty breathing or respiratory distress        [] Abnormal -        Neurological:        [x] No Facial Asymmetry (Cranial nerve 7 motor function) (limited exam due to video visit)          [x] No gaze palsy        [] Abnormal -          Skin:        [x] No significant exanthematous lesions or discoloration noted on facial skin         [] Abnormal -            Psychiatric:       [x] Normal Affect [] Abnormal -        [x] No Hallucinations    Other pertinent observable physical exam findings:-              Assessment & Plan:   Diagnoses and all orders for this visit:    1. Wellness examination  We are getting patient up to date on recommended preventative measures as noted. 2. Alcohol screening    3. Depression screening    4. Colon cancer screening  -     COLOGUARD TEST (FECAL DNA COLORECTAL CANCER SCREENING)    5. Breast cancer screening by mammogram  She has called her gynecology office to schedule an appointment and is waiting to hear back from them. If she does not hear back from them soon she will let me know and I will enter the order. 6. Pure hypercholesterolemia  -     METABOLIC PANEL, COMPREHENSIVE  -     LIPID PANEL  Uncontrolled. We discussed lab results as well as cardiovascular risks today. She is willing to start the atorvastatin. I will plan to have her come in about 3 months from now to have labs rechecked. 7. Acute pain of left shoulder  Patient declines going for an x-ray at this time.   I am recommending topical ice 10 to 20 minutes 1-2 times daily along with regular rotation/range of motion exercises. Try to avoid sleeping on that side if possible. Follow-up and Dispositions    Return in about 1 year (around 10/27/2023) for [address], f/u- ideally fast.         -Pain evaluation performed today  -Cognitive Screen performed today  -Depression Screen, Fall risks (by up and go test)  and ADL functionality were addressed  -Medication list updated and reviewed for any changes   -A comprehensive review of medical issues and a plan was formulated  -End of life planning was addressed with pt   -Health Screenings for preventions were addressed and a plan was formulated  -Discussed with patient cancer risk factors and appropriate screenings for age  -Labs from previous visits were discussed with patient   -Discussed with patient diet and exercise and formulated a plan as needed  -An Advanced care plan was developed with the patient.  -Alcohol screening performed      We discussed the expected course, resolution and complications of the diagnosis(es) in detail. Medication risks, benefits, costs, interactions, and alternatives were discussed as indicated. I advised her to contact the office if her condition worsens, changes or fails to improve as anticipated. She expressed understanding with the diagnosis(es) and plan. Lyla Up is a 81 y.o. female being evaluated by a video visit encounter for concerns as above. A caregiver was present when appropriate. Due to this being a TeleHealth encounter (During IUMVJ-87 public health emergency), evaluation of the following organ systems was limited: Vitals/Constitutional/EENT/Resp/CV/GI//MS/Neuro/Skin/Heme-Lymph-Imm.   Pursuant to the emergency declaration under the [facility], [address]5 waiver authority and the [employer] and [employer] Act, this Virtual  Visit was conducted, with patient's (and/or legal guardian's) consent, to reduce the patient's risk of exposure to COVID-19 and provide necessary medical care. Services were provided through a video synchronous discussion virtually to substitute for in-person clinic visit. Patient and provider were located at their individual homes. Aspects of this note have been generated using voice recognition software. Despite editing, there may be some syntax errors.     Juan Pérez MD

## 2023-02-26 RX ORDER — ATORVASTATIN CALCIUM 10 MG/1
TABLET, FILM COATED ORAL
Qty: 90 TABLET | Refills: 2 | Status: SHIPPED | OUTPATIENT
Start: 2023-02-26

## 2023-05-25 RX ORDER — ALENDRONATE SODIUM 70 MG/1
TABLET ORAL
COMMUNITY
Start: 2023-04-10

## 2023-05-25 RX ORDER — ATORVASTATIN CALCIUM 10 MG/1
1 TABLET, FILM COATED ORAL NIGHTLY
COMMUNITY
Start: 2023-02-27

## 2024-04-03 RX ORDER — ALENDRONATE SODIUM 70 MG/1
TABLET ORAL
Qty: 12 TABLET | OUTPATIENT
Start: 2024-04-03

## 2024-04-03 NOTE — TELEPHONE ENCOUNTER
Called patient and she stated she switched practices and her new doctor will be filling her medications. 0852 4/3/24

## 2024-04-03 NOTE — TELEPHONE ENCOUNTER
Follow-up and Dispositions    Return in about 1 year (around 10/27/2023) for [facility], f/u- ideally fast.

## (undated) DEVICE — BANDAGE COMPR 9 FTX4 IN SMOOTH COMFORTABLE SYNTH ESMRK LF

## (undated) DEVICE — BASIC PACK: Brand: CONVERTORS

## (undated) DEVICE — STERILE POLYISOPRENE POWDER-FREE SURGICAL GLOVES: Brand: PROTEXIS

## (undated) DEVICE — DEVON™ KNEE AND BODY STRAP 60" X 3" (1.5 M X 7.6 CM): Brand: DEVON

## (undated) DEVICE — DRAPE,EXTREMITY,89X128,STERILE: Brand: MEDLINE

## (undated) DEVICE — ZIMMER® STERILE DISPOSABLE TOURNIQUET CUFF WITH PROTECTIVE SLEEVE AND PLC, DUAL PORT, SINGLE BLADDER, 18 IN. (46 CM)

## (undated) DEVICE — GAUZE SPONGES,12 PLY: Brand: CURITY

## (undated) DEVICE — DRAPE,REIN 53X77,STERILE: Brand: MEDLINE

## (undated) DEVICE — HOOK LOCK LATEX FREE ELASTIC BANDAGE 3INX5YD

## (undated) DEVICE — ROCKER SWITCH PENCIL BLADE ELECTRODE, HOLSTER: Brand: EDGE

## (undated) DEVICE — SURGICAL PROCEDURE PACK BASIN MAJ SET CUST NO CAUT

## (undated) DEVICE — STERILE POLYISOPRENE POWDER-FREE SURGICAL GLOVES WITH EMOLLIENT COATING: Brand: PROTEXIS

## (undated) DEVICE — ASTOUND STANDARD SURGICAL GOWN, XL: Brand: CONVERTORS

## (undated) DEVICE — REM POLYHESIVE ADULT PATIENT RETURN ELECTRODE: Brand: VALLEYLAB

## (undated) DEVICE — INTENDED FOR TISSUE SEPARATION, AND OTHER PROCEDURES THAT REQUIRE A SHARP SURGICAL BLADE TO PUNCTURE OR CUT.: Brand: BARD-PARKER ® CARBON RIB-BACK BLADES

## (undated) DEVICE — (D)PREP SKN CHLRAPRP APPL 26ML -- CONVERT TO ITEM 371833

## (undated) DEVICE — STRETCH BANDAGE ROLL: Brand: DERMACEA

## (undated) DEVICE — SOL IRRIGATION INJ NACL 0.9% 500ML BTL

## (undated) DEVICE — SUT ETHLN 3-0 18IN PS1 BLK --

## (undated) DEVICE — INFECTION CONTROL KIT SYS

## (undated) DEVICE — ASTOUND STANDARD SURGICAL GOWN, XXL: Brand: CONVERTORS

## (undated) DEVICE — COVER LT HNDL BLU PLAS